# Patient Record
Sex: MALE | Race: WHITE | NOT HISPANIC OR LATINO | Employment: OTHER | URBAN - METROPOLITAN AREA
[De-identification: names, ages, dates, MRNs, and addresses within clinical notes are randomized per-mention and may not be internally consistent; named-entity substitution may affect disease eponyms.]

---

## 2017-03-07 ENCOUNTER — APPOINTMENT (OUTPATIENT)
Dept: CT IMAGING | Facility: HOSPITAL | Age: 62
End: 2017-03-07

## 2017-03-07 ENCOUNTER — APPOINTMENT (OUTPATIENT)
Dept: ULTRASOUND IMAGING | Facility: HOSPITAL | Age: 62
End: 2017-03-07

## 2017-03-07 ENCOUNTER — HOSPITAL ENCOUNTER (INPATIENT)
Facility: HOSPITAL | Age: 62
LOS: 1 days | Discharge: HOME | End: 2017-03-08
Attending: EMERGENCY MEDICINE | Admitting: SURGERY

## 2017-03-07 DIAGNOSIS — R93.5 ABNORMAL CT OF THE ABDOMEN: ICD-10-CM

## 2017-03-07 DIAGNOSIS — K81.0 ACUTE CHOLECYSTITIS: Primary | ICD-10-CM

## 2017-03-07 LAB
ALBUMIN SERPL-MCNC: 4.6 G/DL (ref 3.5–5.2)
ALBUMIN/GLOB SERPL: 1.7 G/DL
ALP SERPL-CCNC: 53 U/L (ref 39–117)
ALT SERPL W P-5'-P-CCNC: 14 U/L (ref 1–41)
ANION GAP SERPL CALCULATED.3IONS-SCNC: 13.5 MMOL/L
AST SERPL-CCNC: 16 U/L (ref 1–40)
BASOPHILS # BLD AUTO: 0.02 10*3/MM3 (ref 0–0.2)
BASOPHILS NFR BLD AUTO: 0.2 % (ref 0–1.5)
BILIRUB SERPL-MCNC: 0.7 MG/DL (ref 0.1–1.2)
BILIRUB UR QL STRIP: NEGATIVE
BUN BLD-MCNC: 13 MG/DL (ref 8–23)
BUN/CREAT SERPL: 12.6 (ref 7–25)
CALCIUM SPEC-SCNC: 9.5 MG/DL (ref 8.6–10.5)
CHLORIDE SERPL-SCNC: 100 MMOL/L (ref 98–107)
CLARITY UR: CLEAR
CO2 SERPL-SCNC: 26.5 MMOL/L (ref 22–29)
COLOR UR: YELLOW
CREAT BLD-MCNC: 1.03 MG/DL (ref 0.76–1.27)
DEPRECATED RDW RBC AUTO: 42.3 FL (ref 37–54)
EOSINOPHIL # BLD AUTO: 0.02 10*3/MM3 (ref 0–0.7)
EOSINOPHIL NFR BLD AUTO: 0.2 % (ref 0.3–6.2)
ERYTHROCYTE [DISTWIDTH] IN BLOOD BY AUTOMATED COUNT: 13.1 % (ref 11.5–14.5)
GFR SERPL CREATININE-BSD FRML MDRD: 73 ML/MIN/1.73
GLOBULIN UR ELPH-MCNC: 2.7 GM/DL
GLUCOSE BLD-MCNC: 98 MG/DL (ref 65–99)
GLUCOSE UR STRIP-MCNC: NEGATIVE MG/DL
HCT VFR BLD AUTO: 42.3 % (ref 40.4–52.2)
HGB BLD-MCNC: 14.5 G/DL (ref 13.7–17.6)
HGB UR QL STRIP.AUTO: NEGATIVE
HOLD SPECIMEN: NORMAL
HOLD SPECIMEN: NORMAL
IMM GRANULOCYTES # BLD: 0.02 10*3/MM3 (ref 0–0.03)
IMM GRANULOCYTES NFR BLD: 0.2 % (ref 0–0.5)
KETONES UR QL STRIP: NEGATIVE
LEUKOCYTE ESTERASE UR QL STRIP.AUTO: NEGATIVE
LIPASE SERPL-CCNC: 23 U/L (ref 13–60)
LYMPHOCYTES # BLD AUTO: 1.25 10*3/MM3 (ref 0.9–4.8)
LYMPHOCYTES NFR BLD AUTO: 10.5 % (ref 19.6–45.3)
MCH RBC QN AUTO: 29.9 PG (ref 27–32.7)
MCHC RBC AUTO-ENTMCNC: 34.3 G/DL (ref 32.6–36.4)
MCV RBC AUTO: 87.2 FL (ref 79.8–96.2)
MONOCYTES # BLD AUTO: 1.46 10*3/MM3 (ref 0.2–1.2)
MONOCYTES NFR BLD AUTO: 12.3 % (ref 5–12)
NEUTROPHILS # BLD AUTO: 9.13 10*3/MM3 (ref 1.9–8.1)
NEUTROPHILS NFR BLD AUTO: 76.6 % (ref 42.7–76)
NITRITE UR QL STRIP: NEGATIVE
PH UR STRIP.AUTO: 6 [PH] (ref 5–8)
PLATELET # BLD AUTO: 139 10*3/MM3 (ref 140–500)
PMV BLD AUTO: 10.3 FL (ref 6–12)
POTASSIUM BLD-SCNC: 4.5 MMOL/L (ref 3.5–5.2)
PROT SERPL-MCNC: 7.3 G/DL (ref 6–8.5)
PROT UR QL STRIP: NEGATIVE
RBC # BLD AUTO: 4.85 10*6/MM3 (ref 4.6–6)
SODIUM BLD-SCNC: 140 MMOL/L (ref 136–145)
SP GR UR STRIP: 1.01 (ref 1–1.03)
TROPONIN T SERPL-MCNC: <0.01 NG/ML (ref 0–0.03)
UROBILINOGEN UR QL STRIP: NORMAL
WBC NRBC COR # BLD: 11.9 10*3/MM3 (ref 4.5–10.7)
WHOLE BLOOD HOLD SPECIMEN: NORMAL
WHOLE BLOOD HOLD SPECIMEN: NORMAL

## 2017-03-07 PROCEDURE — 85025 COMPLETE CBC W/AUTO DIFF WBC: CPT | Performed by: EMERGENCY MEDICINE

## 2017-03-07 PROCEDURE — 84484 ASSAY OF TROPONIN QUANT: CPT | Performed by: EMERGENCY MEDICINE

## 2017-03-07 PROCEDURE — 25010000002 PIPERACILLIN SOD-TAZOBACTAM PER 1 G: Performed by: SURGERY

## 2017-03-07 PROCEDURE — 0FT44ZZ RESECTION OF GALLBLADDER, PERCUTANEOUS ENDOSCOPIC APPROACH: ICD-10-PCS | Performed by: SURGERY

## 2017-03-07 PROCEDURE — 0 IOPAMIDOL 61 % SOLUTION: Performed by: EMERGENCY MEDICINE

## 2017-03-07 PROCEDURE — 83690 ASSAY OF LIPASE: CPT | Performed by: EMERGENCY MEDICINE

## 2017-03-07 PROCEDURE — 80053 COMPREHEN METABOLIC PANEL: CPT | Performed by: EMERGENCY MEDICINE

## 2017-03-07 PROCEDURE — 76705 ECHO EXAM OF ABDOMEN: CPT

## 2017-03-07 PROCEDURE — 99284 EMERGENCY DEPT VISIT MOD MDM: CPT

## 2017-03-07 PROCEDURE — 81003 URINALYSIS AUTO W/O SCOPE: CPT | Performed by: EMERGENCY MEDICINE

## 2017-03-07 PROCEDURE — 74177 CT ABD & PELVIS W/CONTRAST: CPT

## 2017-03-07 RX ORDER — RIZATRIPTAN BENZOATE 10 MG/1
10 TABLET ORAL ONCE AS NEEDED
Status: DISCONTINUED | OUTPATIENT
Start: 2017-03-07 | End: 2017-03-07 | Stop reason: CLARIF

## 2017-03-07 RX ORDER — SODIUM CHLORIDE 0.9 % (FLUSH) 0.9 %
10 SYRINGE (ML) INJECTION AS NEEDED
Status: DISCONTINUED | OUTPATIENT
Start: 2017-03-07 | End: 2017-03-09 | Stop reason: HOSPADM

## 2017-03-07 RX ORDER — ONDANSETRON 4 MG/1
4 TABLET, FILM COATED ORAL EVERY 6 HOURS PRN
Status: DISCONTINUED | OUTPATIENT
Start: 2017-03-07 | End: 2017-03-09 | Stop reason: HOSPADM

## 2017-03-07 RX ORDER — HYDROMORPHONE HYDROCHLORIDE 1 MG/ML
0.5 INJECTION, SOLUTION INTRAMUSCULAR; INTRAVENOUS; SUBCUTANEOUS
Status: DISCONTINUED | OUTPATIENT
Start: 2017-03-07 | End: 2017-03-08

## 2017-03-07 RX ORDER — SUMATRIPTAN 50 MG/1
50 TABLET, FILM COATED ORAL ONCE AS NEEDED
Status: COMPLETED | OUTPATIENT
Start: 2017-03-07 | End: 2017-03-07

## 2017-03-07 RX ORDER — PANTOPRAZOLE SODIUM 40 MG/10ML
40 INJECTION, POWDER, LYOPHILIZED, FOR SOLUTION INTRAVENOUS
Status: DISCONTINUED | OUTPATIENT
Start: 2017-03-08 | End: 2017-03-09 | Stop reason: HOSPADM

## 2017-03-07 RX ORDER — SODIUM CHLORIDE, SODIUM LACTATE, POTASSIUM CHLORIDE, CALCIUM CHLORIDE 600; 310; 30; 20 MG/100ML; MG/100ML; MG/100ML; MG/100ML
150 INJECTION, SOLUTION INTRAVENOUS CONTINUOUS
Status: DISCONTINUED | OUTPATIENT
Start: 2017-03-07 | End: 2017-03-09 | Stop reason: HOSPADM

## 2017-03-07 RX ORDER — ONDANSETRON 2 MG/ML
4 INJECTION INTRAMUSCULAR; INTRAVENOUS EVERY 6 HOURS PRN
Status: DISCONTINUED | OUTPATIENT
Start: 2017-03-07 | End: 2017-03-09 | Stop reason: HOSPADM

## 2017-03-07 RX ORDER — ONDANSETRON 4 MG/1
4 TABLET, ORALLY DISINTEGRATING ORAL EVERY 6 HOURS PRN
Status: DISCONTINUED | OUTPATIENT
Start: 2017-03-07 | End: 2017-03-09 | Stop reason: HOSPADM

## 2017-03-07 RX ORDER — ACETAMINOPHEN 325 MG/1
650 TABLET ORAL EVERY 4 HOURS PRN
Status: DISCONTINUED | OUTPATIENT
Start: 2017-03-07 | End: 2017-03-09 | Stop reason: HOSPADM

## 2017-03-07 RX ORDER — SODIUM CHLORIDE 0.9 % (FLUSH) 0.9 %
1-10 SYRINGE (ML) INJECTION AS NEEDED
Status: DISCONTINUED | OUTPATIENT
Start: 2017-03-07 | End: 2017-03-09 | Stop reason: HOSPADM

## 2017-03-07 RX ORDER — LEVOTHYROXINE SODIUM 0.1 MG/1
100 TABLET ORAL DAILY
Status: DISCONTINUED | OUTPATIENT
Start: 2017-03-07 | End: 2017-03-09 | Stop reason: HOSPADM

## 2017-03-07 RX ORDER — NALOXONE HCL 0.4 MG/ML
0.4 VIAL (ML) INJECTION
Status: DISCONTINUED | OUTPATIENT
Start: 2017-03-07 | End: 2017-03-09 | Stop reason: HOSPADM

## 2017-03-07 RX ORDER — SODIUM CHLORIDE 9 MG/ML
125 INJECTION, SOLUTION INTRAVENOUS CONTINUOUS
Status: DISCONTINUED | OUTPATIENT
Start: 2017-03-07 | End: 2017-03-07

## 2017-03-07 RX ADMIN — SODIUM CHLORIDE 1000 ML: 9 INJECTION, SOLUTION INTRAVENOUS at 10:28

## 2017-03-07 RX ADMIN — SODIUM CHLORIDE, POTASSIUM CHLORIDE, SODIUM LACTATE AND CALCIUM CHLORIDE 150 ML/HR: 600; 310; 30; 20 INJECTION, SOLUTION INTRAVENOUS at 16:00

## 2017-03-07 RX ADMIN — SUMATRIPTAN SUCCINATE 50 MG: 50 TABLET ORAL at 23:28

## 2017-03-07 RX ADMIN — IOPAMIDOL 85 ML: 612 INJECTION, SOLUTION INTRAVENOUS at 10:39

## 2017-03-07 RX ADMIN — SODIUM CHLORIDE 125 ML/HR: 9 INJECTION, SOLUTION INTRAVENOUS at 13:41

## 2017-03-07 RX ADMIN — LEVOTHYROXINE SODIUM 100 MCG: 100 TABLET ORAL at 17:08

## 2017-03-07 RX ADMIN — TAZOBACTAM SODIUM AND PIPERACILLIN SODIUM 4.5 G: 500; 4 INJECTION, SOLUTION INTRAVENOUS at 13:05

## 2017-03-07 RX ADMIN — SODIUM CHLORIDE, POTASSIUM CHLORIDE, SODIUM LACTATE AND CALCIUM CHLORIDE 150 ML/HR: 600; 310; 30; 20 INJECTION, SOLUTION INTRAVENOUS at 23:29

## 2017-03-07 NOTE — PLAN OF CARE
Problem: Patient Care Overview (Adult)  Goal: Plan of Care Review  Outcome: Ongoing (interventions implemented as appropriate)    03/07/17 2065   Coping/Psychosocial Response Interventions   Plan Of Care Reviewed With patient;spouse;daughter   Outcome Evaluation   Outcome Summary/Follow up Plan pt admitted to unit, has not requested pain medicine, given iv fluids, scds applied, vitals stable. tolertating clear liquid diet.       Goal: Adult Individualization and Mutuality  Outcome: Ongoing (interventions implemented as appropriate)  Goal: Discharge Needs Assessment  Outcome: Ongoing (interventions implemented as appropriate)    Problem: Pain, Acute (Adult)  Goal: Identify Related Risk Factors and Signs and Symptoms  Outcome: Ongoing (interventions implemented as appropriate)  Goal: Acceptable Pain Control/Comfort Level  Outcome: Ongoing (interventions implemented as appropriate)    Problem: Cholecystitis/Cholecystectomy (Adult)  Goal: Signs and Symptoms of Listed Potential Problems Will be Absent or Manageable (Cholecystitis/Cholecystectomy)  Outcome: Ongoing (interventions implemented as appropriate)

## 2017-03-07 NOTE — ED PROVIDER NOTES
" EMERGENCY DEPARTMENT ENCOUNTER    CHIEF COMPLAINT  Chief Complaint: abd pain  History given by: patient, spouse  History limited by: nothing  Room Number: 651/1  PMD: Reji Moyer MD      HPI:  Pt is a 61 y.o. male who presents complaining of intermittent worsening periumbilical \"aching\" abd pain onset 3 weeks ago, with pain becoming severe 1 day ago. Pt also c/o nausea, low-grade fever (99.2), vomiting x1 but denies SOA, chest pain, diarrhea. Pt has no hx of abd surgery. Pt is a smoker but does not drink. He takes Metamucil daily and states the pain worsened yesterday after drinking his Metamucil. He declines any pain medication at this time.    Duration:  3 weeks  Onset: gradual  Timing: intermittent  Location: periumbilical  Radiation: mid-chest and back yesterday  Quality: aching  Intensity/Severity: severe  Progression: worsening  Associated Symptoms: nausea  Aggravating Factors: eating  Alleviating Factors: not eating  Previous Episodes: none  Treatment before arrival: none    PAST MEDICAL HISTORY  Active Ambulatory Problems     Diagnosis Date Noted   • No Active Ambulatory Problems     Resolved Ambulatory Problems     Diagnosis Date Noted   • No Resolved Ambulatory Problems     Past Medical History   Diagnosis Date   • Disease of thyroid gland    • GERD (gastroesophageal reflux disease)    • Hyperlipidemia    • Migraine        PAST SURGICAL HISTORY  Past Surgical History   Procedure Laterality Date   • Hemorrhoidectomy         FAMILY HISTORY  Family History   Problem Relation Age of Onset   • Heart disease Mother    • Heart disease Father    • Stroke Father        SOCIAL HISTORY  Social History     Social History   • Marital status:      Spouse name: N/A   • Number of children: N/A   • Years of education: N/A     Occupational History   • Not on file.     Social History Main Topics   • Smoking status: Current Every Day Smoker     Packs/day: 1.00   • Smokeless tobacco: Not on file   • Alcohol use " No   • Drug use: No   • Sexual activity: Not on file     Other Topics Concern   • Not on file     Social History Narrative       ALLERGIES  Review of patient's allergies indicates no known allergies.    REVIEW OF SYSTEMS  Review of Systems   Constitutional: Positive for fever (99.2). Negative for chills.   HENT: Negative for congestion and sore throat.    Eyes: Negative.    Respiratory: Negative for cough and shortness of breath.    Cardiovascular: Negative for chest pain and leg swelling.   Gastrointestinal: Positive for abdominal pain (periumbilical), nausea and vomiting (x1). Negative for diarrhea.   Genitourinary: Negative for difficulty urinating and dysuria.   Musculoskeletal: Negative for back pain and neck pain.   Skin: Negative for rash and wound.   Allergic/Immunologic: Negative.    Neurological: Negative for dizziness, weakness, numbness and headaches.   Psychiatric/Behavioral: Negative.    All other systems reviewed and are negative.      PHYSICAL EXAM  ED Triage Vitals   Temp Heart Rate Resp BP SpO2   03/07/17 0931 03/07/17 0931 03/07/17 0931 03/07/17 0954 03/07/17 0931   98.2 °F (36.8 °C) 105 18 144/79 97 %      Temp src Heart Rate Source Patient Position BP Location FiO2 (%)   03/07/17 0931 03/07/17 0931 03/07/17 0954 03/07/17 0954 --   Tympanic Monitor Sitting Right arm        Physical Exam   Constitutional: He is oriented to person, place, and time and well-developed, well-nourished, and in no distress.   HENT:   Head: Normocephalic and atraumatic.   Eyes: EOM are normal. Pupils are equal, round, and reactive to light.   Neck: Normal range of motion. Neck supple.   Cardiovascular: Normal rate, regular rhythm and normal heart sounds.    Pulmonary/Chest: Effort normal and breath sounds normal. No respiratory distress.   Abdominal: Soft. There is tenderness (RUQ and R mid). There is no rebound and no guarding.   Musculoskeletal: Normal range of motion. He exhibits no edema.   Neurological: He is alert  and oriented to person, place, and time. He has normal sensation and normal strength.   Skin: Skin is warm and dry.   Psychiatric: Mood and affect normal.   Nursing note and vitals reviewed.      LAB RESULTS  Lab Results (last 24 hours)     Procedure Component Value Units Date/Time    Urinalysis With / Culture If Indicated [[id number]]  (Normal) Collected:  03/07/17 0955    Specimen:  Urine from Urine, Clean Catch Updated:  03/07/17 1035     Color, UA Yellow      Appearance, UA Clear      pH, UA 6.0      Specific Gravity, UA 1.010      Glucose, UA Negative      Ketones, UA Negative      Bilirubin, UA Negative      Blood, UA Negative      Protein, UA Negative      Leuk Esterase, UA Negative      Nitrite, UA Negative      Urobilinogen, UA 0.2 E.U./dL     Narrative:       Urine microscopic not indicated.    CBC & Differential [[id number]] Collected:  03/07/17 0958    Specimen:  Blood Updated:  03/07/17 1008    Narrative:       The following orders were created for panel order CBC & Differential.  Procedure                               Abnormality         Status                     ---------                               -----------         ------                     CBC Auto Differential[[id number]]         Abnormal            Final result                 Please view results for these tests on the individual orders.    Comprehensive Metabolic Panel [[id number]] Collected:  03/07/17 0958    Specimen:  Blood Updated:  03/07/17 1030     Glucose 98 mg/dL      BUN 13 mg/dL      Creatinine 1.03 mg/dL      Sodium 140 mmol/L      Potassium 4.5 mmol/L      Chloride 100 mmol/L      CO2 26.5 mmol/L      Calcium 9.5 mg/dL      Total Protein 7.3 g/dL      Albumin 4.60 g/dL      ALT (SGPT) 14 U/L      AST (SGOT) 16 U/L      Alkaline Phosphatase 53 U/L      Total Bilirubin 0.7 mg/dL      eGFR Non African Amer 73 mL/min/1.73      Globulin 2.7 gm/dL      A/G Ratio 1.7 g/dL      BUN/Creatinine Ratio 12.6      Anion Gap 13.5 mmol/L     Lipase  [[id number]]  (Normal) Collected:  03/07/17 0958    Specimen:  Blood Updated:  03/07/17 1030     Lipase 23 U/L     CBC Auto Differential [[id number]]  (Abnormal) Collected:  03/07/17 0958    Specimen:  Blood Updated:  03/07/17 1008     WBC 11.90 (H) 10*3/mm3      RBC 4.85 10*6/mm3      Hemoglobin 14.5 g/dL      Hematocrit 42.3 %      MCV 87.2 fL      MCH 29.9 pg      MCHC 34.3 g/dL      RDW 13.1 %      RDW-SD 42.3 fl      MPV 10.3 fL      Platelets 139 (L) 10*3/mm3      Neutrophil % 76.6 (H) %      Lymphocyte % 10.5 (L) %      Monocyte % 12.3 (H) %      Eosinophil % 0.2 (L) %      Basophil % 0.2 %      Immature Grans % 0.2 %      Neutrophils, Absolute 9.13 (H) 10*3/mm3      Lymphocytes, Absolute 1.25 10*3/mm3      Monocytes, Absolute 1.46 (H) 10*3/mm3      Eosinophils, Absolute 0.02 10*3/mm3      Basophils, Absolute 0.02 10*3/mm3      Immature Grans, Absolute 0.02 10*3/mm3     Troponin [[id number]]  (Normal) Collected:  03/07/17 0958    Specimen:  Blood Updated:  03/07/17 1049     Troponin T <0.010 ng/mL     Narrative:       Troponin T Reference Ranges:  Less than 0.03 ng/mL:    Negative for AMI  0.03 to 0.09 ng/mL:      Indeterminant for AMI  Greater than 0.09 ng/mL: Positive for AMI        I ordered the above labs and reviewed the results    RADIOLOGY  US Gallbladder   Final Result      CT Abdomen Pelvis With Contrast   Final Result      NM HIDA Scan With Pharmacological Intervention    (Results Pending)     I ordered the above noted radiological studies. Interpreted by radiologist. Discussed with radiologist (Dr. Delgado). Reviewed by me in PACS.       PROCEDURES  Procedures      PROGRESS AND CONSULTS  ED Course     1022  D/w pt and family plan to give IVF and order CT abd/pel for further evaluation. Pt and family understand and agree with the plan. All questions answered.  Ordered CT abd/pel to r/o cholecystitis. Ordered troponin to r/o MI.    1125  Rechecked pt who is resting comfortably. On re-exam pain is mostly in  RUQ, somewhat in mid R abd, suspect gall bladder as source. D/w pt and family CT abd/pel that shows fluid around gallbladder but nothing acute, and abnormal appendix and plan to order US gallbladder, consult surgeon, and admit pt for further evaluation.    1141  Ordered US gallbladder for further evaluation. Placed call to surgery for consult.    1145  Call returned from assistant for Dr. Walker, surgery, who will admit pt.    1201  Call returned from Dr. Walker to discuss pt.    DR Swann saw patient in ED    MEDICAL DECISION MAKING  Results were reviewed/discussed with the patient and they were also made aware of online access. Pt also made aware that some labs, such as cultures, will not be resulted during ER visit and follow up with PMD is necessary.     MDM  Number of Diagnoses or Management Options     Amount and/or Complexity of Data Reviewed  Clinical lab tests: ordered and reviewed (Lipase=23)  Tests in the radiology section of CPT®: ordered and reviewed (CT abd/pel shows pericholecystic fluid and appendix wall irregularities.)  Decide to obtain previous medical records or to obtain history from someone other than the patient: yes  Review and summarize past medical records: yes (Pt was seen this morning at [facility] and sent to the ED. No tests were done at [facility].)  Discuss the patient with other providers: yes (Dr. Delgado, radiology)  Independent visualization of images, tracings, or specimens: yes    Patient Progress  Patient progress: stable         DIAGNOSIS  Final diagnoses:   Acute cholecystitis   Abnormal CT of the appendix       DISPOSITION  ADMISSION    Discussed treatment plan and reason for admission with pt/family and admitting physician.  Pt/family voiced understanding of the plan for admission for further testing/treatment as needed.     Latest Documented Vital Signs:  As of 5:31 PM  BP- 123/69 HR- 76 Temp- 98.5 °F (36.9 °C) (Oral) O2 sat- 96%    --  Documentation assistance provided by junito Albright  Keiry for Dr. Retana.  Information recorded by the scribe was done at my direction and has been verified and validated by me.     Jose Ramon Ricci  03/07/17 120       Pravin Retana MD  03/07/17 6744

## 2017-03-07 NOTE — H&P
"March 7, 2017    Manuelito De Santiago  [id number]      GENERAL SURGERY HISTORY AND PHYSICAL    ADMITTING PHYSICIAN:  Anmol Walker M.D.    PRIMARY PHYSICIAN:   Reji Moyer MD.    DIAGNOSIS:  Right upper quadrant pain.    HISTORY:  Manuelito De Santiago is a 61 y.o. male who is admitted to the hospital with right upper quadrant pain and suspicious for acute cholecystitis.  He has had 3-4 months of intermittent colicky worsening right upper quadrant pain aggravated by eating.  He has been \"dealing with it\" and really hasn't changed his diet very much.  The last week or so the attacks became more severe and lasted in duration for much longer than he was use to.  His family finally convinced him to come to the ER after the one starting yesterday afternoon pre-much carried through the night.  He was seen and evaluated in the ER and found to have right upper quadrant pain but his workup thus far has been fairly negative other than a little fluid around the gallbladder seen on several studies.  Surgery was asked to admit for further evaluation.  The patient has a known history of reflux and this is different.  He denies any blood from above or below and he denies any urinary or lower intestinal complaints.  He's had no chest pain, shortness of breath, fever, chills or other constitutional complaints.  He has thus far refused narcotics secondary to \"the way it makes him feel.\"      Past Medical History   Diagnosis Date   • Disease of thyroid gland    • GERD (gastroesophageal reflux disease)    • Hyperlipidemia    • Migraine      Past Surgical History   Procedure Laterality Date   • Hemorrhoidectomy       Family History   Problem Relation Age of Onset   • Heart disease Mother    • Heart disease Father    • Stroke Father      Social History   Substance Use Topics   • Smoking status: Current Every Day Smoker     Packs/day: 1.00   • Smokeless tobacco: None   • Alcohol use No       Review of Systems  On questioning, the patient " "denies any weight loss, fatigue or headache, no visual changes or hearing complaints, no new cardiovascular or pulmonary complaints, no  complaints, no musculoskeletal or neurologic complaints, no skin, bleeding, psychiatric or endocrine complaints.    Vitals:    03/07/17 1304 03/07/17 1305 03/07/17 1315 03/07/17 1316   BP: 115/62 113/70     BP Location:       Patient Position:       Pulse: 84 84 84    Resp:       Temp:    99.3 °F (37.4 °C)   TempSrc:    Tympanic   SpO2: 96% 95% 96%    Weight:       Height:           Physical Exam  Visit Vitals   • /70   • Pulse 84   • Temp 99.3 °F (37.4 °C) (Tympanic)   • Resp 18   • Ht 70\" (177.8 cm)   • Wt 165 lb (74.8 kg)   • SpO2 96%   • BMI 23.68 kg/m2       On exam, patient is alert oriented and appropriate and is in no acute distress.  HEENT:   Head is normocephalic, both external ears are normal and sclerae are nonicteric.  Neck:  Supple without lymphadenopathy.  Heart:  Regular without obvious murmur.  Chest:  Good respiratory effort bilaterally.  Abdomen:  Soft, nondistended, tenderness moderate, with voluntary guarding (Macdonald's sign), without rebound - RUQ, RLQ negative, no masses palpated.  Rectal:  Deferred.  Extremities:  Without edema.  Skin:  Negative.    Diagnostic Imaging: CT scan images and results reviewed.  US Gallbladder         CT Abdomen Pelvis With Contrast   Final Result          Labs:   Lab Results   Component Value Date    WBC 11.90 (H) 03/07/2017    HGB 14.5 03/07/2017    HCT 42.3 03/07/2017     (L) 03/07/2017     Lab Results   Component Value Date     03/07/2017    K 4.5 03/07/2017     03/07/2017    CO2 26.5 03/07/2017    BUN 13 03/07/2017    CREATININE 1.03 03/07/2017    GLUCOSE 98 03/07/2017     Lab Results   Component Value Date    AST 16 03/07/2017    ALT 14 03/07/2017    ALKPHOS 53 03/07/2017       ASSESSMENT/PLAN:  Patient is a 61 y.o. male who is admitted to the hospital with right upper quadrant pain, a Macdonald sign " on exam and fairly classic biliary colic type complaints all seemingly consistent with cholecystitis including his mild leukocytosis.  However, thus far, his x-ray tests have not confirmed said diagnosis.  Therefore, he will be admitted, supported and treated conservatively and a HIDA scan will be ordered for the morning.  If the HIDA scan is abnormal, we will proceed with a laparoscopic cholecystectomy.  If it is not abnormal, an upper endoscopy will be considered as the next phase of his evaluation.  The plan was discussed at length and gone over with him and his family at the bedside and all agreement.    Anmol Walker M.D.

## 2017-03-08 ENCOUNTER — ANESTHESIA EVENT (OUTPATIENT)
Dept: PERIOP | Facility: HOSPITAL | Age: 62
End: 2017-03-08

## 2017-03-08 ENCOUNTER — APPOINTMENT (OUTPATIENT)
Dept: NUCLEAR MEDICINE | Facility: HOSPITAL | Age: 62
End: 2017-03-08

## 2017-03-08 ENCOUNTER — ANESTHESIA (OUTPATIENT)
Dept: PERIOP | Facility: HOSPITAL | Age: 62
End: 2017-03-08

## 2017-03-08 VITALS
RESPIRATION RATE: 16 BRPM | WEIGHT: 166 LBS | HEIGHT: 70 IN | DIASTOLIC BLOOD PRESSURE: 67 MMHG | HEART RATE: 69 BPM | BODY MASS INDEX: 23.77 KG/M2 | SYSTOLIC BLOOD PRESSURE: 124 MMHG | TEMPERATURE: 97.4 F | OXYGEN SATURATION: 95 %

## 2017-03-08 PROBLEM — K81.0 ACUTE CHOLECYSTITIS: Status: ACTIVE | Noted: 2017-03-08

## 2017-03-08 PROBLEM — K81.0 ACUTE CHOLECYSTITIS: Status: RESOLVED | Noted: 2017-03-07 | Resolved: 2017-03-08

## 2017-03-08 PROCEDURE — 25010000002 KETOROLAC TROMETHAMINE PER 15 MG: Performed by: NURSE ANESTHETIST, CERTIFIED REGISTERED

## 2017-03-08 PROCEDURE — 25010000002 FENTANYL CITRATE (PF) 100 MCG/2ML SOLUTION: Performed by: NURSE ANESTHETIST, CERTIFIED REGISTERED

## 2017-03-08 PROCEDURE — 88304 TISSUE EXAM BY PATHOLOGIST: CPT | Performed by: SURGERY

## 2017-03-08 PROCEDURE — 78227 HEPATOBIL SYST IMAGE W/DRUG: CPT

## 2017-03-08 PROCEDURE — G0378 HOSPITAL OBSERVATION PER HR: HCPCS

## 2017-03-08 PROCEDURE — 25010000002 ONDANSETRON PER 1 MG: Performed by: NURSE ANESTHETIST, CERTIFIED REGISTERED

## 2017-03-08 PROCEDURE — 25010000002 LEVOFLOXACIN PER 250 MG: Performed by: SURGERY

## 2017-03-08 PROCEDURE — 25010000002 NEOSTIGMINE 10 MG/10ML SOLUTION: Performed by: NURSE ANESTHETIST, CERTIFIED REGISTERED

## 2017-03-08 PROCEDURE — 25010000002 PROPOFOL 10 MG/ML EMULSION: Performed by: NURSE ANESTHETIST, CERTIFIED REGISTERED

## 2017-03-08 PROCEDURE — 25010000002 DEXAMETHASONE PER 1 MG: Performed by: NURSE ANESTHETIST, CERTIFIED REGISTERED

## 2017-03-08 PROCEDURE — 0 TECHNETIUM TC 99M MEBROFENIN KIT: Performed by: SURGERY

## 2017-03-08 PROCEDURE — 25010000002 MIDAZOLAM PER 1 MG: Performed by: ANESTHESIOLOGY

## 2017-03-08 PROCEDURE — A9537 TC99M MEBROFENIN: HCPCS | Performed by: SURGERY

## 2017-03-08 PROCEDURE — 25010000002 SINCALIDE PER 5 MCG: Performed by: SURGERY

## 2017-03-08 RX ORDER — KETOROLAC TROMETHAMINE 30 MG/ML
INJECTION, SOLUTION INTRAMUSCULAR; INTRAVENOUS AS NEEDED
Status: DISCONTINUED | OUTPATIENT
Start: 2017-03-08 | End: 2017-03-08 | Stop reason: SURG

## 2017-03-08 RX ORDER — GLYCOPYRROLATE 0.2 MG/ML
INJECTION INTRAMUSCULAR; INTRAVENOUS AS NEEDED
Status: DISCONTINUED | OUTPATIENT
Start: 2017-03-08 | End: 2017-03-08 | Stop reason: SURG

## 2017-03-08 RX ORDER — NEOSTIGMINE METHYLSULFATE 1 MG/ML
INJECTION, SOLUTION INTRAVENOUS AS NEEDED
Status: DISCONTINUED | OUTPATIENT
Start: 2017-03-08 | End: 2017-03-08 | Stop reason: SURG

## 2017-03-08 RX ORDER — HYDROMORPHONE HYDROCHLORIDE 1 MG/ML
0.5 INJECTION, SOLUTION INTRAMUSCULAR; INTRAVENOUS; SUBCUTANEOUS
Status: DISCONTINUED | OUTPATIENT
Start: 2017-03-08 | End: 2017-03-09 | Stop reason: HOSPADM

## 2017-03-08 RX ORDER — MIDAZOLAM HYDROCHLORIDE 1 MG/ML
1 INJECTION INTRAMUSCULAR; INTRAVENOUS
Status: DISCONTINUED | OUTPATIENT
Start: 2017-03-08 | End: 2017-03-09 | Stop reason: HOSPADM

## 2017-03-08 RX ORDER — METOCLOPRAMIDE HYDROCHLORIDE 5 MG/ML
10 INJECTION INTRAMUSCULAR; INTRAVENOUS ONCE
Status: DISCONTINUED | OUTPATIENT
Start: 2017-03-08 | End: 2017-03-09 | Stop reason: HOSPADM

## 2017-03-08 RX ORDER — ROCURONIUM BROMIDE 10 MG/ML
INJECTION, SOLUTION INTRAVENOUS AS NEEDED
Status: DISCONTINUED | OUTPATIENT
Start: 2017-03-08 | End: 2017-03-08 | Stop reason: SURG

## 2017-03-08 RX ORDER — METOCLOPRAMIDE HYDROCHLORIDE 5 MG/ML
INJECTION INTRAMUSCULAR; INTRAVENOUS
Status: DISPENSED
Start: 2017-03-08 | End: 2017-03-09

## 2017-03-08 RX ORDER — FENTANYL CITRATE 50 UG/ML
INJECTION, SOLUTION INTRAMUSCULAR; INTRAVENOUS AS NEEDED
Status: DISCONTINUED | OUTPATIENT
Start: 2017-03-08 | End: 2017-03-08 | Stop reason: SURG

## 2017-03-08 RX ORDER — FAMOTIDINE 10 MG/ML
20 INJECTION, SOLUTION INTRAVENOUS ONCE
Status: COMPLETED | OUTPATIENT
Start: 2017-03-08 | End: 2017-03-08

## 2017-03-08 RX ORDER — ONDANSETRON 2 MG/ML
INJECTION INTRAMUSCULAR; INTRAVENOUS AS NEEDED
Status: DISCONTINUED | OUTPATIENT
Start: 2017-03-08 | End: 2017-03-08 | Stop reason: SURG

## 2017-03-08 RX ORDER — MIDAZOLAM HYDROCHLORIDE 1 MG/ML
2 INJECTION INTRAMUSCULAR; INTRAVENOUS
Status: DISCONTINUED | OUTPATIENT
Start: 2017-03-08 | End: 2017-03-09 | Stop reason: HOSPADM

## 2017-03-08 RX ORDER — HYDROCODONE BITARTRATE AND ACETAMINOPHEN 7.5; 325 MG/1; MG/1
1 TABLET ORAL ONCE AS NEEDED
Status: DISCONTINUED | OUTPATIENT
Start: 2017-03-08 | End: 2017-03-09 | Stop reason: HOSPADM

## 2017-03-08 RX ORDER — PROMETHAZINE HYDROCHLORIDE 25 MG/1
12.5 TABLET ORAL ONCE AS NEEDED
Status: DISCONTINUED | OUTPATIENT
Start: 2017-03-08 | End: 2017-03-09 | Stop reason: HOSPADM

## 2017-03-08 RX ORDER — SODIUM CHLORIDE, SODIUM LACTATE, POTASSIUM CHLORIDE, CALCIUM CHLORIDE 600; 310; 30; 20 MG/100ML; MG/100ML; MG/100ML; MG/100ML
9 INJECTION, SOLUTION INTRAVENOUS CONTINUOUS
Status: DISCONTINUED | OUTPATIENT
Start: 2017-03-08 | End: 2017-03-09 | Stop reason: HOSPADM

## 2017-03-08 RX ORDER — KIT FOR THE PREPARATION OF TECHNETIUM TC 99M MEBROFENIN 45 MG/10ML
1 INJECTION, POWDER, LYOPHILIZED, FOR SOLUTION INTRAVENOUS
Status: COMPLETED | OUTPATIENT
Start: 2017-03-08 | End: 2017-03-08

## 2017-03-08 RX ORDER — SODIUM CHLORIDE 9 MG/ML
INJECTION, SOLUTION INTRAVENOUS AS NEEDED
Status: DISCONTINUED | OUTPATIENT
Start: 2017-03-08 | End: 2017-03-08 | Stop reason: HOSPADM

## 2017-03-08 RX ORDER — FENTANYL CITRATE 50 UG/ML
50 INJECTION, SOLUTION INTRAMUSCULAR; INTRAVENOUS
Status: DISCONTINUED | OUTPATIENT
Start: 2017-03-08 | End: 2017-03-09 | Stop reason: HOSPADM

## 2017-03-08 RX ORDER — DIPHENHYDRAMINE HYDROCHLORIDE 50 MG/ML
12.5 INJECTION INTRAMUSCULAR; INTRAVENOUS
Status: DISCONTINUED | OUTPATIENT
Start: 2017-03-08 | End: 2017-03-09 | Stop reason: HOSPADM

## 2017-03-08 RX ORDER — BUPIVACAINE HYDROCHLORIDE AND EPINEPHRINE 2.5; 5 MG/ML; UG/ML
INJECTION, SOLUTION INFILTRATION; PERINEURAL AS NEEDED
Status: DISCONTINUED | OUTPATIENT
Start: 2017-03-08 | End: 2017-03-08 | Stop reason: HOSPADM

## 2017-03-08 RX ORDER — OXYCODONE HYDROCHLORIDE AND ACETAMINOPHEN 5; 325 MG/1; MG/1
2 TABLET ORAL ONCE AS NEEDED
Status: DISCONTINUED | OUTPATIENT
Start: 2017-03-08 | End: 2017-03-09 | Stop reason: HOSPADM

## 2017-03-08 RX ORDER — ONDANSETRON 2 MG/ML
4 INJECTION INTRAMUSCULAR; INTRAVENOUS ONCE AS NEEDED
Status: DISCONTINUED | OUTPATIENT
Start: 2017-03-08 | End: 2017-03-09 | Stop reason: HOSPADM

## 2017-03-08 RX ORDER — PROMETHAZINE HYDROCHLORIDE 25 MG/ML
12.5 INJECTION, SOLUTION INTRAMUSCULAR; INTRAVENOUS ONCE AS NEEDED
Status: DISCONTINUED | OUTPATIENT
Start: 2017-03-08 | End: 2017-03-09 | Stop reason: HOSPADM

## 2017-03-08 RX ORDER — SODIUM CHLORIDE 0.9 % (FLUSH) 0.9 %
1-10 SYRINGE (ML) INJECTION AS NEEDED
Status: DISCONTINUED | OUTPATIENT
Start: 2017-03-08 | End: 2017-03-09 | Stop reason: HOSPADM

## 2017-03-08 RX ORDER — DEXAMETHASONE SODIUM PHOSPHATE 10 MG/ML
INJECTION INTRAMUSCULAR; INTRAVENOUS AS NEEDED
Status: DISCONTINUED | OUTPATIENT
Start: 2017-03-08 | End: 2017-03-08 | Stop reason: SURG

## 2017-03-08 RX ORDER — OXYCODONE HYDROCHLORIDE AND ACETAMINOPHEN 5; 325 MG/1; MG/1
2 TABLET ORAL EVERY 4 HOURS PRN
Qty: 30 TABLET | Refills: 0 | Status: SHIPPED | OUTPATIENT
Start: 2017-03-08 | End: 2020-05-15

## 2017-03-08 RX ORDER — LEVOFLOXACIN 5 MG/ML
500 INJECTION, SOLUTION INTRAVENOUS EVERY 24 HOURS
Status: DISCONTINUED | OUTPATIENT
Start: 2017-03-08 | End: 2017-03-09 | Stop reason: HOSPADM

## 2017-03-08 RX ORDER — LIDOCAINE HYDROCHLORIDE 20 MG/ML
INJECTION, SOLUTION INFILTRATION; PERINEURAL AS NEEDED
Status: DISCONTINUED | OUTPATIENT
Start: 2017-03-08 | End: 2017-03-08 | Stop reason: SURG

## 2017-03-08 RX ORDER — PROPOFOL 10 MG/ML
VIAL (ML) INTRAVENOUS AS NEEDED
Status: DISCONTINUED | OUTPATIENT
Start: 2017-03-08 | End: 2017-03-08 | Stop reason: SURG

## 2017-03-08 RX ORDER — PROMETHAZINE HYDROCHLORIDE 25 MG/1
25 SUPPOSITORY RECTAL ONCE AS NEEDED
Status: DISCONTINUED | OUTPATIENT
Start: 2017-03-08 | End: 2017-03-09 | Stop reason: HOSPADM

## 2017-03-08 RX ORDER — OXYCODONE AND ACETAMINOPHEN 7.5; 325 MG/1; MG/1
1 TABLET ORAL ONCE AS NEEDED
Status: DISCONTINUED | OUTPATIENT
Start: 2017-03-08 | End: 2017-03-09 | Stop reason: HOSPADM

## 2017-03-08 RX ORDER — LABETALOL HYDROCHLORIDE 5 MG/ML
5 INJECTION, SOLUTION INTRAVENOUS
Status: DISCONTINUED | OUTPATIENT
Start: 2017-03-08 | End: 2017-03-09 | Stop reason: HOSPADM

## 2017-03-08 RX ORDER — PROMETHAZINE HYDROCHLORIDE 25 MG/1
25 TABLET ORAL ONCE AS NEEDED
Status: DISCONTINUED | OUTPATIENT
Start: 2017-03-08 | End: 2017-03-09 | Stop reason: HOSPADM

## 2017-03-08 RX ORDER — FLUMAZENIL 0.1 MG/ML
0.2 INJECTION INTRAVENOUS AS NEEDED
Status: DISCONTINUED | OUTPATIENT
Start: 2017-03-08 | End: 2017-03-09 | Stop reason: HOSPADM

## 2017-03-08 RX ORDER — MAGNESIUM HYDROXIDE 1200 MG/15ML
LIQUID ORAL AS NEEDED
Status: DISCONTINUED | OUTPATIENT
Start: 2017-03-08 | End: 2017-03-08 | Stop reason: HOSPADM

## 2017-03-08 RX ORDER — HYDRALAZINE HYDROCHLORIDE 20 MG/ML
5 INJECTION INTRAMUSCULAR; INTRAVENOUS
Status: DISCONTINUED | OUTPATIENT
Start: 2017-03-08 | End: 2017-03-09 | Stop reason: HOSPADM

## 2017-03-08 RX ADMIN — DEXAMETHASONE SODIUM PHOSPHATE 6 MG: 10 INJECTION INTRAMUSCULAR; INTRAVENOUS at 11:36

## 2017-03-08 RX ADMIN — LEVOFLOXACIN 500 MG: 5 INJECTION, SOLUTION INTRAVENOUS at 11:07

## 2017-03-08 RX ADMIN — GLYCOPYRROLATE 0.6 MG: 0.2 INJECTION INTRAMUSCULAR; INTRAVENOUS at 11:51

## 2017-03-08 RX ADMIN — KETOROLAC TROMETHAMINE 30 MG: 30 INJECTION, SOLUTION INTRAMUSCULAR; INTRAVENOUS at 11:43

## 2017-03-08 RX ADMIN — SODIUM CHLORIDE, POTASSIUM CHLORIDE, SODIUM LACTATE AND CALCIUM CHLORIDE 9 ML/HR: 600; 310; 30; 20 INJECTION, SOLUTION INTRAVENOUS at 10:30

## 2017-03-08 RX ADMIN — OXYCODONE HYDROCHLORIDE AND ACETAMINOPHEN 2 TABLET: 5; 325 TABLET ORAL at 13:14

## 2017-03-08 RX ADMIN — MEBROFENIN 1 DOSE: 45 INJECTION, POWDER, LYOPHILIZED, FOR SOLUTION INTRAVENOUS at 06:32

## 2017-03-08 RX ADMIN — PROPOFOL 200 MG: 10 INJECTION, EMULSION INTRAVENOUS at 11:24

## 2017-03-08 RX ADMIN — FENTANYL CITRATE 50 MCG: 50 INJECTION INTRAMUSCULAR; INTRAVENOUS at 11:41

## 2017-03-08 RX ADMIN — FAMOTIDINE 20 MG: 10 INJECTION, SOLUTION INTRAVENOUS at 11:03

## 2017-03-08 RX ADMIN — SODIUM CHLORIDE, POTASSIUM CHLORIDE, SODIUM LACTATE AND CALCIUM CHLORIDE 150 ML/HR: 600; 310; 30; 20 INJECTION, SOLUTION INTRAVENOUS at 06:06

## 2017-03-08 RX ADMIN — ROCURONIUM BROMIDE 25 MG: 10 INJECTION INTRAVENOUS at 11:24

## 2017-03-08 RX ADMIN — LIDOCAINE HYDROCHLORIDE 60 MG: 20 INJECTION, SOLUTION INFILTRATION; PERINEURAL at 11:24

## 2017-03-08 RX ADMIN — NEOSTIGMINE METHYLSULFATE 4 MG: 1 INJECTION INTRAVENOUS at 11:51

## 2017-03-08 RX ADMIN — FENTANYL CITRATE 50 MCG: 50 INJECTION INTRAMUSCULAR; INTRAVENOUS at 12:07

## 2017-03-08 RX ADMIN — MIDAZOLAM 2 MG: 1 INJECTION INTRAMUSCULAR; INTRAVENOUS at 11:03

## 2017-03-08 RX ADMIN — FENTANYL CITRATE 50 MCG: 50 INJECTION INTRAMUSCULAR; INTRAVENOUS at 11:23

## 2017-03-08 RX ADMIN — ONDANSETRON 4 MG: 2 INJECTION INTRAMUSCULAR; INTRAVENOUS at 11:43

## 2017-03-08 NOTE — ANESTHESIA PROCEDURE NOTES
Airway  Urgency: elective    Date/Time: 3/8/2017 11:27 AM  Airway not difficult    General Information and Staff    Patient location during procedure: OR  Anesthesiologist: CHATO LEIVA  CRNA: DINESH ESPINAL    Indications and Patient Condition  Indications for airway management: airway protection    Preoxygenated: yes  Mask difficulty assessment: 1 - vent by mask    Final Airway Details  Final airway type: endotracheal airway      Successful airway: ETT  Cuffed: yes   Successful intubation technique: direct laryngoscopy  Endotracheal tube insertion site: oral  Blade: Nick  Blade size: #4  ETT size: 7.5 mm  Cormack-Lehane Classification: grade I - full view of glottis  Placement verified by: chest auscultation and capnometry   Measured from: lips  ETT to lips (cm): 22  Number of attempts at approach: 1    Additional Comments  Pre 02 , sivi,, easy bvm, dlx1, dvvc, intubation x 1 attempt, appears atraumatic, teeth unchanged

## 2017-03-08 NOTE — PLAN OF CARE
Problem: Patient Care Overview (Adult)  Goal: Discharge Needs Assessment  Outcome: Outcome(s) achieved Date Met:  03/08/17 03/08/17 2461   Discharge Needs Assessment   Concerns To Be Addressed no discharge needs identified   Equipment Needed After Discharge none   Discharge Disposition home or self-care   Self-Care   Equipment Currently Used at Home none   Current Health   Anticipated Changes Related to Illness none   Living Environment   Transportation Available car;family or friend will provide         Problem: Pain, Acute (Adult)  Goal: Identify Related Risk Factors and Signs and Symptoms  Outcome: Outcome(s) achieved Date Met:  03/08/17

## 2017-03-08 NOTE — PLAN OF CARE
Problem: Cholecystitis/Cholecystectomy (Adult)  Goal: Signs and Symptoms of Listed Potential Problems Will be Absent or Manageable (Cholecystitis/Cholecystectomy)  Outcome: Outcome(s) achieved Date Met:  03/08/17 03/08/17 1351   Cholecystitis/Cholecystectomy   Problems Assessed (Cholecystitis/Cholecystectomy) all   Problems Present (Cholecystitis/Cholecystectomy) none

## 2017-03-08 NOTE — OP NOTE
March 8, 2017    Manuelito De Santiago  [id number]    PROCEDURE:  Laparoscopic cholecystectomy.    PREOPERATIVE DIAGNOSIS:  acute cholecystitis.    POSTOPERATIVE DIAGNOSIS:  same.     SURGEON:  Anmol Walker M.D.    ASSISTANT:  None.    ANESTHESIA:  GETA.    FINDINGS:  Edematous distended obviously acutely inflamed gallbladder from which 40 cc of bile had to be aspirated just to allow for grasping and retraction.    INDICATIONS:  Patient presents today with a recently diagnosed acute cholecystitis.  HIDA scan this morning revealed a nonvisualized gallbladder worrisome for the same.  After seeing the patient and reviewing the studies, I recommended consideration for laparoscopic cholecystectomy this admission with discharge home soon after.  The procedure, risks, benefits and alternatives were discussed including but not limited to bleeding, infection, opening, internal organ injury as well as the need for further surgery acutely or chronically down the line.  They expressed agreement and understanding.  Questions were answered to their satisfaction.    PROCEDURE:  Patient was taken to the operating room and placed supine on the operating room table. After establishment of adequate general endotracheal anesthesia, the abdomen was prepped and draped in normal sterile fashion.  The procedure was then initiated by making a 5 mm transverse infraumbilical incision through which a bladeless was trocar was inserted into the abdominal cavity.  After insufflation a camera was inserted and the abdomen cavity was inspected.  With no contraindications, I then placed under direct vision 3 subcostal trochars, an 11 mm subxiphoid trocar, and 2 lateral 5 mm trochars. Two blunt gallbladder graspers were then inserted and the gallbladder was dissected free and elevated.  I grabbed the fundus first and retracted superiorly and the Beckwith's pouch and retracted laterally.  This opened up the triangle of Calot.  Both blunt and sharp  dissection were then used to define the length of the gallbladder, the Beckwith's pouch and the triangle of Calot.  Once in the triangle, I then dissected out the cystic duct and artery.  Both were identified, completely cleared, clipped and divided.  With both structures divided and no other structures identified, and the anatomic relationships confirmed, I then began removing the gallbladder from its hepatic attachments using cautery and countertraction.  Once the gallbladder was completely  from the liver bed, it was then removed through the subxiphoid port without much difficulty.  The right upper quadrant was then irrigated with saline and inspected.  Once no evidence of continued bleeding or signs of bile leak were noted, instruments and trocars were removed and counted correctly.  Local anesthetic solution was injected into all the incisions which were then closed with 4-0 Vicryl subcuticular suture and then covered by steristrips and Band-Aids.  Anesthesia was reversed and the patient was taken recovery in satisfactory condition.    Anmol Walker M.D.

## 2017-03-08 NOTE — PLAN OF CARE
Problem: Patient Care Overview (Adult)  Goal: Plan of Care Review  Outcome: Outcome(s) achieved Date Met:  03/08/17 03/08/17 4708   Coping/Psychosocial Response Interventions   Plan Of Care Reviewed With patient   Patient Care Overview   Progress improving

## 2017-03-08 NOTE — PLAN OF CARE
Problem: Patient Care Overview (Adult)  Goal: Discharge Needs Assessment  Outcome: Ongoing (interventions implemented as appropriate)    03/08/17 0815   Discharge Needs Assessment   Concerns To Be Addressed no discharge needs identified   Equipment Needed After Discharge none   Self-Care   Equipment Currently Used at Home none

## 2017-03-08 NOTE — PLAN OF CARE
Problem: Patient Care Overview (Adult)  Goal: Plan of Care Review  Outcome: Ongoing (interventions implemented as appropriate)    03/08/17 1232   Coping/Psychosocial Response Interventions   Plan Of Care Reviewed With patient   Patient Care Overview   Progress improving       Goal: Adult Individualization and Mutuality  Outcome: Ongoing (interventions implemented as appropriate)  Goal: Discharge Needs Assessment  Outcome: Ongoing (interventions implemented as appropriate)

## 2017-03-08 NOTE — ANESTHESIA PREPROCEDURE EVALUATION
Anesthesia Evaluation     Patient summary reviewed and Nursing notes reviewed   NPO Status: > 8 hours   Airway   Mallampati: II  TM distance: >3 FB  Neck ROM: full  no difficulty expected  Dental - normal exam     Pulmonary - normal exam   (+) a smoker Current,   Cardiovascular - negative cardio ROS and normal exam  Exercise tolerance: good (4-7 METS)    ECG reviewed  Rhythm: regular  Rate: normal        Neuro/Psych  (+) headaches,    GI/Hepatic/Renal/Endo    (+)  GERD,     Musculoskeletal (-) negative ROS    Abdominal  - normal exam   Substance History - negative use     OB/GYN          Other - negative ROS                                   Anesthesia Plan    ASA 2     general     intravenous induction   Anesthetic plan and risks discussed with patient.

## 2017-03-08 NOTE — PLAN OF CARE
Problem: Perioperative Period (Adult)  Goal: Signs and Symptoms of Listed Potential Problems Will be Absent or Manageable (Perioperative Period)  Outcome: Outcome(s) achieved Date Met:  03/08/17 03/08/17 2226   Perioperative Period   Problems Assessed (Perioperative Period) all   Problems Present (Perioperative Period) none

## 2017-03-08 NOTE — PLAN OF CARE
Problem: Patient Care Overview (Adult)  Goal: Plan of Care Review  Outcome: Ongoing (interventions implemented as appropriate)    03/08/17 0451   Coping/Psychosocial Response Interventions   Plan Of Care Reviewed With patient   Outcome Evaluation   Outcome Summary/Follow up Plan No c/o abdominal pain, medicated x1 for HA. Continues on IVF, NPO since [place].   Patient Care Overview   Progress no change       Goal: Adult Individualization and Mutuality  Outcome: Ongoing (interventions implemented as appropriate)  Goal: Discharge Needs Assessment  Outcome: Ongoing (interventions implemented as appropriate)    Problem: Pain, Acute (Adult)  Goal: Identify Related Risk Factors and Signs and Symptoms  Outcome: Ongoing (interventions implemented as appropriate)  Goal: Acceptable Pain Control/Comfort Level  Outcome: Ongoing (interventions implemented as appropriate)    Problem: Cholecystitis/Cholecystectomy (Adult)  Goal: Signs and Symptoms of Listed Potential Problems Will be Absent or Manageable (Cholecystitis/Cholecystectomy)  Outcome: Ongoing (interventions implemented as appropriate)

## 2017-03-08 NOTE — PLAN OF CARE
Problem: Perioperative Period (Adult)  Goal: Signs and Symptoms of Listed Potential Problems Will be Absent or Manageable (Perioperative Period)  Outcome: Ongoing (interventions implemented as appropriate)    03/08/17 0906   Perioperative Period   Problems Assessed (Perioperative Period) all   Problems Present (Perioperative Period) none

## 2017-03-08 NOTE — PLAN OF CARE
Problem: Perioperative Period (Adult)  Goal: Signs and Symptoms of Listed Potential Problems Will be Absent or Manageable (Perioperative Period)  Outcome: Ongoing (interventions implemented as appropriate)    03/08/17 1231   Perioperative Period   Problems Assessed (Perioperative Period) pain;wound complications;embolism;hypothermia;hypoxia/hypoxemia   Problems Present (Perioperative Period) none

## 2017-03-08 NOTE — PLAN OF CARE
Problem: Pain, Acute (Adult)  Goal: Acceptable Pain Control/Comfort Level  Outcome: Outcome(s) achieved Date Met:  03/08/17 03/08/17 1351   Pain, Acute (Adult)   Acceptable Pain Control/Comfort Level achieves outcome

## 2017-03-08 NOTE — ANESTHESIA POSTPROCEDURE EVALUATION
Patient: Manuelito De Santiago    Procedure Summary     Date Anesthesia Start Anesthesia Stop Room / Location    03/08/17 1120 1208  [facility]  /  [facility]       Procedure Diagnosis Surgeon Provider    CHOLECYSTECTOMY LAPAROSCOPIC (N/A Abdomen) Acute cholecystitis  (Acute cholecystitis [K81.0]) MD Ryley Gómez MD          Anesthesia Type: general  Last vitals  /67 (03/08/17 1348)    Temp 36.3 °C (97.4 °F) (03/08/17 1330)    Pulse 69 (03/08/17 1348)   Resp 16 (03/08/17 1348)    SpO2 95 % (03/08/17 1348)      Post Anesthesia Care and Evaluation    Patient location during evaluation: bedside  Patient participation: complete - patient participated  Level of consciousness: awake  Pain score: 1  Pain management: adequate  Airway patency: patent  Anesthetic complications: No anesthetic complications    Cardiovascular status: acceptable  Respiratory status: acceptable  Hydration status: acceptable

## 2017-03-09 LAB
CYTO UR: NORMAL
LAB AP CASE REPORT: NORMAL
Lab: NORMAL
PATH REPORT.FINAL DX SPEC: NORMAL
PATH REPORT.GROSS SPEC: NORMAL

## 2017-03-15 NOTE — PAYOR COMM NOTE
"Manuelito So (61 y.o. Male)                                                   REF#[id number]                                  NO D/C SUM AVAILABLE                                    D/C ON 3/8/17      Date of Birth Social Security Number Address Home Phone MRN    1955  [address] [place] [id number] [phone] [id number]    [facility] Mar[facility] Status          None        Admission Date Admission Type Admitting Provider Attending Provider Department, Room/Bed    3/7/17 Emergency Anmol Walker MD  [facility], [facility]    Discharge Date Discharge Disposition Discharge Destination        3/8/2017 Home or Self Care             Attending Provider: (none)    Allergies:  No Known Allergies    Isolation:  None   Infection:  None   Code Status:  Prior    Ht:  70\" (177.8 cm)   Wt:  166 lb (75.3 kg)    Admission Cmt:  None   Principal Problem:  None                Active Insurance as of 3/7/2017     Primary Coverage     Payor Plan Insurance Group Employer/Plan Group    [employer] PPO [id number]     Payor Plan Address Payor Plan Phone Number Effective From Effective To    [address] [phone] 3/7/2017     [place] [address]       Subscriber Name Subscriber Birth Date Member ID       MANUELITO SO 1955 [id number]                 Emergency Contacts      (Rel.) Home Phone Work Phone Mobile Phone    Silvina So (Significant Other) [phone] -- --            Insurance Information                [employer]OSS/[employer] BLUE [employer] PPO Phone: [phone]    Subscriber: Anyi Manuelito ELIEZER Subscriber#: [id number]    Group#: [id number] Precert#:              Operative/Procedure Notes (all)      Anmol Walker MD at 3/8/2017 11:51 AM  Version 1 of 1 March 8, 2017    Manuelito So  [id number]    PROCEDURE:  Laparoscopic cholecystectomy.    PREOPERATIVE DIAGNOSIS:  acute " cholecystitis.    POSTOPERATIVE DIAGNOSIS:  same.     SURGEON:  Anmol Walker M.D.    ASSISTANT:  None.    ANESTHESIA:  GETA.    FINDINGS:  Edematous distended obviously acutely inflamed gallbladder from which 40 cc of bile had to be aspirated just to allow for grasping and retraction.    INDICATIONS:  Patient presents today with a recently diagnosed acute cholecystitis.  HIDA scan this morning revealed a nonvisualized gallbladder worrisome for the same.  After seeing the patient and reviewing the studies, I recommended consideration for laparoscopic cholecystectomy this admission with discharge home soon after.  The procedure, risks, benefits and alternatives were discussed including but not limited to bleeding, infection, opening, internal organ injury as well as the need for further surgery acutely or chronically down the line.  They expressed agreement and understanding.  Questions were answered to their satisfaction.    PROCEDURE:  Patient was taken to the operating room and placed supine on the operating room table. After establishment of adequate general endotracheal anesthesia, the abdomen was prepped and draped in normal sterile fashion.  The procedure was then initiated by making a 5 mm transverse infraumbilical incision through which a bladeless was trocar was inserted into the abdominal cavity.  After insufflation a camera was inserted and the abdomen cavity was inspected.  With no contraindications, I then placed under direct vision 3 subcostal trochars, an 11 mm subxiphoid trocar, and 2 lateral 5 mm trochars. Two blunt gallbladder graspers were then inserted and the gallbladder was dissected free and elevated.  I grabbed the fundus first and retracted superiorly and the Beckwith's pouch and retracted laterally.  This opened up the triangle of Calot.  Both blunt and sharp dissection were then used to define the length of the gallbladder, the Beckwith's pouch and the triangle of Calot.  Once in the  triangle, I then dissected out the cystic duct and artery.  Both were identified, completely cleared, clipped and divided.  With both structures divided and no other structures identified, and the anatomic relationships confirmed, I then began removing the gallbladder from its hepatic attachments using cautery and countertraction.  Once the gallbladder was completely  from the liver bed, it was then removed through the subxiphoid port without much difficulty.  The right upper quadrant was then irrigated with saline and inspected.  Once no evidence of continued bleeding or signs of bile leak were noted, instruments and trocars were removed and counted correctly.  Local anesthetic solution was injected into all the incisions which were then closed with 4-0 Vicryl subcuticular suture and then covered by steristrips and Band-Aids.  Anesthesia was reversed and the patient was taken recovery in satisfactory condition.    Anmol Walker M.D.     Electronically signed by Anmol Walker MD at 3/8/2017 11:52 AM

## 2017-03-15 NOTE — PAYOR COMM NOTE
"Maru So (61 y.o. Male)                                                                    REF# [id number]                                        PATIENT DISCHARGED   3/8/17                                          NO D/C  SUMMARY AVAILABLE                                Date of Birth Social Security Number Address Home Phone MRN    1955  [facility] [facility] [address] [phone] [id number]    [employer] Marital Status          None        Admission Date Admission Type Admitting Provider Attending Provider Department, Room/Bed    3/7/17 Emergency Anmol Walker MD  [facility] OR, [facility] OR    Discharge Date Discharge Disposition Discharge Destination        3/8/2017 Home or Self Care             Attending Provider: (none)    Allergies:  No Known Allergies    Isolation:  None   Infection:  None   Code Status:  Prior    Ht:  70\" (177.8 cm)   Wt:  166 lb (75.3 kg)    Admission Cmt:  None   Principal Problem:  None                Active Insurance as of 3/7/2017     Primary Coverage     Payor Plan Insurance Group Employer/Plan Group    [employer] PPO [id number]     Payor Plan Address Payor Plan Phone Number Effective From Effective To    [address] [phone] 3/7/2017     [place] [address]       Subscriber Name Subscriber Birth Date Member ID       MARU SO 1955 [id number]                 Emergency Contacts      (Rel.) Home Phone Work Phone Mobile Phone    Silvina So (Significant Other) [phone] -- --            Insurance Information                [employer] BLUE CROSS/[employer] BLUE [employer] PPO Phone: [phone]    Subscriber: Maru So Subscriber#: [id number]    Group#: [id number] Precert#:                "

## 2019-11-29 PROBLEM — J32.9 SINUSITIS, BACTERIAL: Status: ACTIVE | Noted: 2019-11-29

## 2019-11-29 PROBLEM — B96.89 SINUSITIS, BACTERIAL: Status: ACTIVE | Noted: 2019-11-29

## 2020-05-15 RX ORDER — LEVOTHYROXINE SODIUM 0.1 MG/1
100 TABLET ORAL NIGHTLY
COMMUNITY

## 2020-05-15 RX ORDER — MELOXICAM 15 MG/1
15 TABLET ORAL DAILY
COMMUNITY
End: 2020-09-17 | Stop reason: HOSPADM

## 2020-05-15 RX ORDER — SILDENAFIL CITRATE 20 MG/1
20 TABLET ORAL 3 TIMES DAILY
COMMUNITY
End: 2020-05-21

## 2020-05-15 RX ORDER — HYDROCODONE BITARTRATE AND ACETAMINOPHEN 5; 325 MG/1; MG/1
1 TABLET ORAL EVERY 6 HOURS PRN
COMMUNITY
End: 2020-05-21

## 2020-05-15 RX ORDER — ATORVASTATIN CALCIUM 10 MG/1
10 TABLET, FILM COATED ORAL NIGHTLY
COMMUNITY

## 2020-05-15 RX ORDER — CYCLOBENZAPRINE HCL 10 MG
10 TABLET ORAL 3 TIMES DAILY PRN
COMMUNITY
End: 2020-09-11

## 2020-05-15 RX ORDER — RIZATRIPTAN BENZOATE 10 MG/1
10 TABLET, ORALLY DISINTEGRATING ORAL ONCE AS NEEDED
COMMUNITY

## 2020-05-15 RX ORDER — TAMSULOSIN HYDROCHLORIDE 0.4 MG/1
1 CAPSULE ORAL NIGHTLY
COMMUNITY

## 2020-05-15 RX ORDER — OMEPRAZOLE 20 MG/1
20 CAPSULE, DELAYED RELEASE ORAL DAILY
COMMUNITY
End: 2020-05-21

## 2020-05-15 NOTE — PROGRESS NOTES
Subjective   History of Present Illness: Manuelito De Santiago is a 64 y.o. male is being seen for consultation today at the request of Reji Moyer MD for constant moderate back pain that radiates into bilateral legs; right greater than left. He reports pain in his posterior right buttock, thigh to his knee. He reports tingling in bilateral legs. He is having numbness in his right foot. He denies bladder/bowl incontinence. He is being followed by the pain institute for pain management. He has had a L-ZARA x1 with no relief. He is taking oral narcotics as well. He is taking Gabapentin 300 mg TID and has noticed mild relief with nerve pain.  He has another epidural scheduled next week.  Despite his symptoms he has been able to maintain a fairly active lifestyle but has been troubled by this since his prison in late January 2020.    While in the room and during my examination of the patient I wore a mask and eye protection.  I washed my hands before and after this patient encounter.  The patient was also wearing a mask.    Back Pain   The problem occurs constantly. The problem has been gradually worsening since onset. The pain is present in the lumbar spine. The quality of the pain is described as aching. The pain radiates to the right thigh, right knee, right foot, left thigh, left knee and left foot. The symptoms are aggravated by position, lying down, sitting and standing. Associated symptoms include headaches, leg pain, numbness (right foot), tingling (bilateral legs) and weakness. Pertinent negatives include no bladder incontinence, bowel incontinence, chest pain or fever. He has tried NSAIDs (L-ESIx1, oral narcotics) for the symptoms.       The following portions of the patient's history were reviewed and updated as appropriate: allergies, current medications, past family history, past medical history, past social history, past surgical history and problem list.    While in the room and during my examination  "of the patient I wore a mask and eye protection.  I washed my hands before and after this patient encounter.  The patient was also wearing a mask.    Review of Systems   Constitutional: Positive for activity change. Negative for fever.   HENT: Negative for congestion.    Eyes: Negative.  Negative for visual disturbance.   Respiratory: Negative.  Negative for chest tightness and shortness of breath.    Cardiovascular: Negative.  Negative for chest pain.   Gastrointestinal: Negative.  Negative for bowel incontinence and nausea.   Endocrine: Negative.  Negative for cold intolerance and heat intolerance.   Genitourinary: Positive for urgency. Negative for bladder incontinence.   Musculoskeletal: Positive for back pain, gait problem and neck pain.   Skin: Negative.  Negative for rash.   Allergic/Immunologic: Positive for environmental allergies.   Neurological: Positive for tingling (bilateral legs), weakness, numbness (right foot) and headaches.   Hematological: Negative.  Does not bruise/bleed easily.   Psychiatric/Behavioral: Positive for sleep disturbance.       Objective     Vitals:    05/21/20 1129   BP: 135/74   Pulse: 75   Temp: 96.2 °F (35.7 °C)   Weight: 78.9 kg (174 lb)   Height: 177.8 cm (70\")     Body mass index is 24.97 kg/m².      Physical Exam  Neurologic Exam     Physical Exam:    CONSTITUTIONAL: This 64 year old right handed  male appears well developed, well-nourished and in no acute distress.    HEAD & FACE: the head and face are symmetric, normocephalic and atraumatic.    EYES: Inspection of the conjunctivae and lids reveals no swelling, erythema or discharge.  Pupils are round, equal and reactive to light and there is no scleral icterus.    EARS, NOSE, MOUTH & THROAT: On inspection, the ears and nose are within normal limits.    NECK: the neck is supple and symmetric. The trachea is midline with no masses.    PULMONARY: Respiratory effort is normal with no increased work of breathing or " signs of respiratory distress.    CARDIOVASCULAR: Pedal pulses are +2/4 bilaterally. Examination of the extremities shows no edema or varicosities.    LYMPHATIC: There is no palpable lymphadenopathy of the neck.    MUSCULOSKELETAL: Gait and station are within normal limits. The spine has normal alignment and range of motion, there is no palpable abnormality lumbar spine and no tenderness    SKIN: The skin is warm, dry and intact    NEUROLOGIC:   Cranial Nerves 2-12 intact  Normal motor strength noted. Muscle bulk and tone are normal.  Sensory exam is normal to all modalities.  Reflexes on the right side demonstrates 2/4 Triceps Reflex, 2/4 Biceps Reflex, 2/4 Brachioradialis Reflex, 2/4 Knee Jerk Reflex, 1/4 Ankle Jerk Reflex and no ankle clonus on the right.   Reflexes on the left side demonstrates 2/4 Triceps Reflex, 2/4 Biceps Reflex, 2/4 Brachioradialis Reflex, 2/4 Knee Jerk Reflex, 1/4 Ankle Jerk Reflex and no ankle clonus on the left.  Superficial/Primitive Reflexes: primitive reflexes were absent.  Finch's, Babinski clonus are negative  No coordination deficit observed.  Radicular testing showed a negative Jay (MARIELLA) test and positive straight leg raise on the right.  Cortical function is intact and without deficits. Speech is normal.    PSYCHIATRIC: oriented to person, place and time. Patient's mood and affect are normal.      Assessment/Plan   Independent Review of Radiographic Studies:      I personally reviewed the images from the following studies.    MRI of the lumbar spine done at [facility] on April 22, 2020 reveals multilevel spondylolisthesis of 2 mm of L3 on 4 and 2 mm of L4 on 5.  Marrow edema is present at the right L5 pedicle and the left facet joint as well as the right L4 pedicle suggesting stress change.  L5-S1 reveals severe disc desiccation with spondylitic change causing potential contact of both exiting L5 nerve roots.  Due to the spondylolisthesis at L3-4 and L4-5 there is the  potential for nerve root encroachment at those 2 levels bilaterally as well.    Medical Decision Making:      He has multilevel spinal stenosis and lateral recess stenosis due to spondylitic change particularly at L3-4 and L4-5 and to some degree L5-S1.  He does have a spondylolisthesis although my review of the films does not suggest that it is as significant as the radiologist mentions.  In fact I think the degree of spondylosis likely stiff in his spine such that he is not unstable.  Regardless of any 2 pieces of information since he has not responded to conservative measures and is starting to get lower extremity complaints that is a primary medical clearance and flexion-extension lumbar films.    He intends to get a second epidural next week.  It is not uncommon for patients to not get a positive result from 1 injection but get a dramatic result from 2 injections so we will see what he gets from that result in addition to the primary medical clearance and flexion-extension lumbar films.  If he goes on to require an operative intervention I would hope he would only require decompressive surgery but if there is instability I will need to send him to a spinal subspecialist to consider stabilization.    Return for review of completed workup and discussion of results of pain management.    Manuelito was seen today for back pain.    Diagnoses and all orders for this visit:    Spinal stenosis, lumbar region, with neurogenic claudication  -     XR Spine Lumbar Complete With Flex & Ext; Future  -     Ambulatory Referral to Family Practice    Sciatica of right side  -     XR Spine Lumbar Complete With Flex & Ext; Future  -     Ambulatory Referral to Family Practice    Spondylolisthesis of lumbar region  -     XR Spine Lumbar Complete With Flex & Ext; Future  -     Ambulatory Referral to Family Practice      Addendum later on May 21, 2020 the patient went for his flexion-extension films upon leaving my office today which  showed the slight spondylolisthesis of L3-4 and L4-5 but no motion with flexion/extension.       Dwight Mcdermott MD FACS FAANS  Neurological Surgery

## 2020-05-21 ENCOUNTER — HOSPITAL ENCOUNTER (OUTPATIENT)
Dept: GENERAL RADIOLOGY | Facility: HOSPITAL | Age: 65
Discharge: HOME | End: 2020-05-21
Admitting: NEUROLOGICAL SURGERY

## 2020-05-21 ENCOUNTER — OFFICE VISIT (OUTPATIENT)
Dept: NEUROSURGERY | Facility: CLINIC | Age: 65
End: 2020-05-21

## 2020-05-21 VITALS
DIASTOLIC BLOOD PRESSURE: 74 MMHG | HEART RATE: 75 BPM | BODY MASS INDEX: 24.91 KG/M2 | WEIGHT: 174 LBS | SYSTOLIC BLOOD PRESSURE: 135 MMHG | HEIGHT: 70 IN | TEMPERATURE: 96.2 F

## 2020-05-21 DIAGNOSIS — M54.31 SCIATICA OF RIGHT SIDE: ICD-10-CM

## 2020-05-21 DIAGNOSIS — M48.062 SPINAL STENOSIS, LUMBAR REGION, WITH NEUROGENIC CLAUDICATION: ICD-10-CM

## 2020-05-21 DIAGNOSIS — M48.062 SPINAL STENOSIS, LUMBAR REGION, WITH NEUROGENIC CLAUDICATION: Primary | ICD-10-CM

## 2020-05-21 DIAGNOSIS — M43.16 SPONDYLOLISTHESIS OF LUMBAR REGION: ICD-10-CM

## 2020-05-21 PROCEDURE — 99204 OFFICE O/P NEW MOD 45 MIN: CPT | Performed by: NEUROLOGICAL SURGERY

## 2020-05-21 PROCEDURE — 72114 X-RAY EXAM L-S SPINE BENDING: CPT

## 2020-06-03 NOTE — PROGRESS NOTES
Subjective   History of Present Illness: Manuelito De Santiago is a 64 y.o. male is here today for follow-up with a new lumbar xray. Mr. De Santiago was last seen on 05/21/20 with complaints of moderate back pain that radiates into bilateral legs; right greater than left. He also reported tingling in bilateral legs and numbness in his right foot. Today, Mr. De Santiago reports intermittent back pain that radiates into bilateral legs. He reports constant numbness in bilateral feet; right greater than left.  Feels like the gabapentin prescribed by his primary physician has helped him more than the epidurals or anything else.  He does not feel like his lifestyle is limited at this point although if he does walk about half a mile or longer he does get recurrent right leg pain.  At this point he wants to see how he is able to manage things through the summer and come back to talk to me again in 3 months.    While in the room and during my examination of the patient I wore a mask and eye protection.  I washed my hands before and after this patient encounter.  The patient was also wearing a mask.    Back Pain   The problem occurs intermittently. The problem is unchanged. The pain is present in the lumbar spine. The quality of the pain is described as aching, burning, cramping, shooting and stabbing. The pain radiates to the right thigh, right knee, right foot, left thigh, left knee and left foot. The pain is at a severity of 4/10. The pain is moderate. The symptoms are aggravated by position. Stiffness is present in the morning and at night. Associated symptoms include leg pain and numbness. Pertinent negatives include no bladder incontinence, bowel incontinence, chest pain, tingling or weakness. He has tried NSAIDs (L-ESIx1 and oral narcotics) for the symptoms.       The following portions of the patient's history were reviewed and updated as appropriate: allergies, current medications, past family history, past medical history, past  "social history, past surgical history and problem list.    Review of Systems   Constitutional: Positive for activity change.   Respiratory: Negative for chest tightness and shortness of breath.    Cardiovascular: Negative for chest pain.   Gastrointestinal: Negative for bowel incontinence.   Genitourinary: Negative for bladder incontinence.   Musculoskeletal: Positive for back pain.   Neurological: Positive for numbness. Negative for tingling and weakness.       Objective     Vitals:    06/09/20 1221   BP: 139/74   Pulse: 68   Temp: 99.5 °F (37.5 °C)   Weight: 78.9 kg (174 lb)   Height: 177.8 cm (70\")     Body mass index is 24.97 kg/m².      Physical Exam  Neurologic Exam     Physical Exam:    CONSTITUTIONAL:  appears well developed, well-nourished and in no acute distress.    NECK: the neck is supple and symmetric. The trachea is midline with no masses.      PULMONARY: Respiratory effort is normal with no increased work of breathing or signs of respiratory distress.    CARDIOVASCULAR: Pedal pulses are +2/4 bilaterally. Examination of the extremities shows no edema or varicosities.    MUSCULOSKELETAL: Gait he does not demonstrate any lumbago today.  He is nontender to palpation of the lumbar spine    SKIN: The skin is warm, dry and intact.      NEUROLOGIC:   Normal motor strength noted. Muscle bulk and tone are normal.  Sensory exam is normal to all modalities.  Straight raising test Jay sign is negative today which is improved from my initial exam in May 2020.  Reflexes 2/4 at the knees 1/4 at the ankles symmetrical.  Cortical function is intact and without deficits. Speech is normal.    PSYCHIATRIC: oriented to person, place and time. Patient's mood and affect are normal.    Assessment/Plan   Independent Review of Radiographic Studies:      I personally reviewed the images from the following studies.    MRI of the lumbar spine done at [facility] on April 22, 2020 reveals multilevel spondylolisthesis of 2 " mm of L3 on 4 and 2 mm of L4 on 5.  Marrow edema is present at the right L5 pedicle and the left facet joint as well as the right L4 pedicle suggesting stress change.  L5-S1 reveals severe disc desiccation with spondylitic change causing potential contact of both exiting L5 nerve roots.  Due to the spondylolisthesis at L3-4 and L4-5 there is the potential for nerve root encroachment at those 2 levels bilaterally as well.    Plain films of the lumbar spine were done on May 21, 2020 at [facility] and revealed a 2 mm anterolisthesis of L4 on L5 and mild 3 mm of retrolisthesis of T12 on L1 without any change between flexion/extension motion.    Medical Decision Making:      He is satisfied with the improvement he has had 3 interventional pain management and medical management through his primary physician.  He is able to do the things he needs to do without significant limitation or pain.  He does still get some right lower extremity symptoms with more activity but he is able to manage that without having to consider surgery.  I told him that the flexion-extension films of his lumbar spine revealed stability of the slight deformities and therefore if he does develop recurrent neurogenic claudication or sciatica and we have to consider surgery he would not require a spinal fusion.    At this point he is hopeful to try to avoid any surgical intervention and wants to continue with the medical management only.  We will see him back in 3 months.    Return in about 3 months (around 9/9/2020) for recheck.    Manuelito was seen today for follow-up and back pain.    Diagnoses and all orders for this visit:    Spinal stenosis, lumbar region, with neurogenic claudication    Spondylolisthesis of lumbar region             Dwight Mcdermott MD FACS FAANS  Neurological Surgery

## 2020-06-09 ENCOUNTER — OFFICE VISIT (OUTPATIENT)
Dept: NEUROSURGERY | Facility: CLINIC | Age: 65
End: 2020-06-09

## 2020-06-09 VITALS
HEIGHT: 70 IN | WEIGHT: 174 LBS | HEART RATE: 68 BPM | TEMPERATURE: 99.5 F | SYSTOLIC BLOOD PRESSURE: 139 MMHG | BODY MASS INDEX: 24.91 KG/M2 | DIASTOLIC BLOOD PRESSURE: 74 MMHG

## 2020-06-09 DIAGNOSIS — M43.16 SPONDYLOLISTHESIS OF LUMBAR REGION: ICD-10-CM

## 2020-06-09 DIAGNOSIS — M48.062 SPINAL STENOSIS, LUMBAR REGION, WITH NEUROGENIC CLAUDICATION: Primary | ICD-10-CM

## 2020-06-09 PROCEDURE — 99213 OFFICE O/P EST LOW 20 MIN: CPT | Performed by: NEUROLOGICAL SURGERY

## 2020-09-04 NOTE — PROGRESS NOTES
Subjective   History of Present Illness: Manuelito De Santiago is a 65 y.o. male is here today for 3 month follow-up. He was last seen on 06/09/20 with complaints of intermittent back pain that radiates into bilateral legs. Today, Mr. DeS antiago reports constant back pain but the severity changes.  Over the summer since his conservative treatments he has developed progressive worsening of the lower extremity pain that he can only walk 100 feet without having to stop because of the pain he gets in one or both legs.  He describes a positive grocery cart sign when trying to get through a department store.  He reports bilateral leg pain with numbness, tingling and weakness. He denies bladder/bowel incontinence. He does reports urinary urgency. He is taking Norco 7.5-325 mg q4-6hrs PRN and Flexeril 10 mg TID PRN.  His lifestyle is significantly limited once again despite the medical and conservative management.    While in the room and during my examination of the patient I wore a mask and eye protection.  I washed my hands before and after this patient encounter.  The patient was also wearing a mask.    Back Pain   The problem occurs constantly. The problem has been gradually worsening since onset. The pain is present in the lumbar spine. The quality of the pain is described as aching. The pain radiates to the right thigh, right knee, right foot, left thigh, left knee and left foot. The pain is the same all the time. The symptoms are aggravated by position and standing (walking). Associated symptoms include leg pain, numbness and weakness. Pertinent negatives include no bladder incontinence, bowel incontinence or chest pain. He has tried muscle relaxant (oral narcotics and injections) for the symptoms.       The following portions of the patient's history were reviewed and updated as appropriate: allergies, current medications, past family history, past medical history, past social history, past surgical history and problem  "list.    Review of Systems   Constitutional: Positive for activity change.   Respiratory: Negative for chest tightness and shortness of breath.    Cardiovascular: Negative for chest pain.   Gastrointestinal: Negative for bowel incontinence.   Genitourinary: Negative for bladder incontinence.   Musculoskeletal: Positive for back pain.   Neurological: Positive for weakness and numbness.       Objective     Vitals:    09/10/20 1008   BP: 118/73   Pulse: 81   Temp: 99.1 °F (37.3 °C)   Weight: 78.9 kg (174 lb)   Height: 177.8 cm (70\")     Body mass index is 24.97 kg/m².      Physical Exam  Neurologic Exam    Physical Exam:    CONSTITUTIONAL:  appears well developed, well-nourished and in no acute distress.    NECK: the neck is supple and symmetric. The trachea is midline with no masses.      PULMONARY: Respiratory effort is normal with no increased work of breathing or signs of respiratory distress.    CARDIOVASCULAR: Pedal pulses are +2/4 bilaterally. Examination of the extremities shows no edema or varicosities.    MUSCULOSKELETAL: Gait with a lumbago style gait with a bent over posture    SKIN: The skin is warm, dry and intact.      NEUROLOGIC:   Cranial Nerves 2-12 intact  Normal motor strength noted. Muscle bulk and tone are normal.  Sensory exam is normal to all modalities.  Reflexes on the right side demonstrates 2/4 Triceps Reflex, 2/4 Biceps Reflex, 2/4 Brachioradialis Reflex, 2/4 Knee Jerk Reflex, 1/4 Ankle Jerk Reflex and no ankle clonus on the right.   Reflexes on the left side demonstrates 2/4 Triceps Reflex, 2/4 Biceps Reflex, 2/4 Brachioradialis Reflex, 2/4 Knee Jerk Reflex, 1/4 Ankle Jerk Reflex and no ankle clonus on the left.  Superficial/Primitive Reflexes: primitive reflexes were absent.  Finch's, Babinski clonus are negative  No coordination deficit observed.  Radicular testing showed a negative Jay (MARIELLA) test and  negative straight leg raise  laterally t.  Cortical function is intact and " without deficits. Speech is normal.    PSYCHIATRIC: oriented to person, place and time. Patient's mood and affect are normal.      Assessment/Plan   Independent Review of Radiographic Studies:      I personally reviewed the images from the following studies.    MRI of the lumbar spine done at [facility] on April 22, 2020 reveals multilevel spondylolisthesis of 2 mm of L3 on 4 and 2 mm of L4 on 5.  Marrow edema is present at the right L5 pedicle and the left facet joint as well as the right L4 pedicle suggesting stress change.  L5-S1 reveals severe disc desiccation with spondylitic change causing potential contact of both exiting L5 nerve roots.  Due to the spondylolisthesis at L3-4 and L4-5 there is the potential for nerve root encroachment at those 2 levels bilaterally as well.     Plain films of the lumbar spine were done on May 21, 2020 at [facility] and revealed a 2 mm anterolisthesis of L4 on L5 and mild 3 mm of retrolisthesis of T12 on L1 without any change between flexion/extension motion.    Medical Decision Making:      He has progressed to develop intractable neurogenic claudication at fairly short distances up to 100 feet.  We know he has severe lumbar stenosis at L3-4 and L4-5 and flexion-extension films revealed although he has some areas of slight spondylolisthesis he is stable with motion.  Therefore he is now a candidate for an L3-L5 lumbar laminectomy for decompression at L3-4, L4-5 and L5-S1.    A Lumbar Laminectomy involves a small skin incision localized over the affected spinal area which is confirmed by X-ray. Then the thickened bone is removed off the back of the spine with a drill to expose the nerves. Any thickened bone along the disc or joints are removed also to make more room around the nerves.    The patient understands that there are inherent risks to surgery including bleeding, infection, anesthetic risk, death, heart attack, stroke, damage to nerves, weakness, numbness,  paralysis, failure to improve, need for further surgery, CSF leak, recurrence, persistent pain, and any other unforeseen complications. They comprehend and had opportunity to ask further questions.    Return for follow-up after surgery.    Manuelito was seen today for follow-up and back pain.    Diagnoses and all orders for this visit:    Spinal stenosis, lumbar region, with neurogenic claudication  -     Case Request; Standing  -     Case Request    Spondylolisthesis of lumbar region  -     Case Request; Standing  -     Case Request    Other orders  -     Follow anesthesia standing orders.  -     Obtain informed consent  -     Provide NPO Instructions to Patient; Future             Dwight Mcdermott MD FACS [facility]  Neurological Surgery

## 2020-09-10 ENCOUNTER — OFFICE VISIT (OUTPATIENT)
Dept: NEUROSURGERY | Facility: CLINIC | Age: 65
End: 2020-09-10

## 2020-09-10 VITALS
BODY MASS INDEX: 24.91 KG/M2 | SYSTOLIC BLOOD PRESSURE: 118 MMHG | TEMPERATURE: 99.1 F | HEIGHT: 70 IN | DIASTOLIC BLOOD PRESSURE: 73 MMHG | WEIGHT: 174 LBS | HEART RATE: 81 BPM

## 2020-09-10 DIAGNOSIS — M48.062 SPINAL STENOSIS, LUMBAR REGION, WITH NEUROGENIC CLAUDICATION: Primary | ICD-10-CM

## 2020-09-10 DIAGNOSIS — M43.16 SPONDYLOLISTHESIS OF LUMBAR REGION: ICD-10-CM

## 2020-09-10 PROCEDURE — 99214 OFFICE O/P EST MOD 30 MIN: CPT | Performed by: NEUROLOGICAL SURGERY

## 2020-09-10 RX ORDER — CEFAZOLIN SODIUM 2 G/100ML
2 INJECTION, SOLUTION INTRAVENOUS ONCE
Status: CANCELLED | OUTPATIENT
Start: 2020-09-16 | End: 2020-09-10

## 2020-09-11 ENCOUNTER — TRANSCRIBE ORDERS (OUTPATIENT)
Dept: PREADMISSION TESTING | Facility: HOSPITAL | Age: 65
End: 2020-09-11

## 2020-09-11 ENCOUNTER — APPOINTMENT (OUTPATIENT)
Dept: PREADMISSION TESTING | Facility: HOSPITAL | Age: 65
End: 2020-09-11

## 2020-09-11 VITALS
WEIGHT: 161 LBS | BODY MASS INDEX: 23.05 KG/M2 | OXYGEN SATURATION: 98 % | TEMPERATURE: 98 F | SYSTOLIC BLOOD PRESSURE: 147 MMHG | RESPIRATION RATE: 16 BRPM | HEART RATE: 80 BPM | DIASTOLIC BLOOD PRESSURE: 75 MMHG | HEIGHT: 70 IN

## 2020-09-11 DIAGNOSIS — Z01.818 OTHER SPECIFIED PRE-OPERATIVE EXAMINATION: Primary | ICD-10-CM

## 2020-09-11 LAB
ANION GAP SERPL CALCULATED.3IONS-SCNC: 8.1 MMOL/L (ref 5–15)
BUN SERPL-MCNC: 15 MG/DL (ref 8–23)
BUN/CREAT SERPL: 15.2 (ref 7–25)
CALCIUM SPEC-SCNC: 9.4 MG/DL (ref 8.6–10.5)
CHLORIDE SERPL-SCNC: 105 MMOL/L (ref 98–107)
CO2 SERPL-SCNC: 27.9 MMOL/L (ref 22–29)
CREAT SERPL-MCNC: 0.99 MG/DL (ref 0.76–1.27)
DEPRECATED RDW RBC AUTO: 44.3 FL (ref 37–54)
ERYTHROCYTE [DISTWIDTH] IN BLOOD BY AUTOMATED COUNT: 13 % (ref 12.3–15.4)
GFR SERPL CREATININE-BSD FRML MDRD: 76 ML/MIN/1.73
GLUCOSE SERPL-MCNC: 91 MG/DL (ref 65–99)
HCT VFR BLD AUTO: 45.5 % (ref 37.5–51)
HGB BLD-MCNC: 14.5 G/DL (ref 13–17.7)
MCH RBC QN AUTO: 29.4 PG (ref 26.6–33)
MCHC RBC AUTO-ENTMCNC: 31.9 G/DL (ref 31.5–35.7)
MCV RBC AUTO: 92.1 FL (ref 79–97)
PLATELET # BLD AUTO: 152 10*3/MM3 (ref 140–450)
PMV BLD AUTO: 10.1 FL (ref 6–12)
POTASSIUM SERPL-SCNC: 4.5 MMOL/L (ref 3.5–5.2)
RBC # BLD AUTO: 4.94 10*6/MM3 (ref 4.14–5.8)
SODIUM SERPL-SCNC: 141 MMOL/L (ref 136–145)
WBC # BLD AUTO: 7.49 10*3/MM3 (ref 3.4–10.8)

## 2020-09-11 PROCEDURE — 93010 ELECTROCARDIOGRAM REPORT: CPT | Performed by: INTERNAL MEDICINE

## 2020-09-11 PROCEDURE — 80048 BASIC METABOLIC PNL TOTAL CA: CPT | Performed by: NEUROLOGICAL SURGERY

## 2020-09-11 PROCEDURE — 36415 COLL VENOUS BLD VENIPUNCTURE: CPT

## 2020-09-11 PROCEDURE — 93005 ELECTROCARDIOGRAM TRACING: CPT

## 2020-09-11 PROCEDURE — 85027 COMPLETE CBC AUTOMATED: CPT | Performed by: NEUROLOGICAL SURGERY

## 2020-09-11 RX ORDER — OMEPRAZOLE 20 MG/1
20 CAPSULE, DELAYED RELEASE ORAL DAILY
COMMUNITY

## 2020-09-11 NOTE — DISCHARGE INSTRUCTIONS
Take the following medications the morning of surgery:  NEURONTIN, OMEPRAZOLE    ARRIVE TO MAIN OR DESK 9/16/2020 AT 8:00AM    If you are on prescription narcotic pain medication to control your pain you may also take that medication the morning of surgery.    General Instructions:  • Do not eat solid food after midnight the night before surgery.  • You may drink clear liquids day of surgery but must stop at least one hour before your hospital arrival time.(7:00AM)  • It is beneficial for you to have a clear drink that contains carbohydrates the day of surgery.  We suggest a 12 to 20 ounce bottle of Gatorade or Powerade for non-diabetic patients or a 12 to 20 ounce bottle of G2 or Powerade Zero for diabetic patients. (Pediatric patients, are not advised to drink a 12 to 20 ounce carbohydrate drink)    Clear liquids are liquids you can see through.  Nothing red in color.     Plain water                               Sports drinks  Sodas                                   Gelatin (Jell-O)  Fruit juices without pulp such as white grape juice and apple juice  Popsicles that contain no fruit or yogurt  Tea or coffee (no cream or milk added)  Gatorade / Powerade  G2 / Powerade Zero    • Infants may have breast milk up to four hours before surgery.  • Infants drinking formula may drink formula up to six hours before surgery.   • Patients who avoid smoking, chewing tobacco and alcohol for 4 weeks prior to surgery have a reduced risk of post-operative complications.  Quit smoking as many days before surgery as you can.  • Do not smoke, use chewing tobacco or drink alcohol the day of surgery.   • If applicable bring your C-PAP/ BI-PAP machine.  • Bring any papers given to you in the doctor’s office.  • Wear clean comfortable clothes.  • Do not wear contact lenses, false eyelashes or make-up.  Bring a case for your glasses.   • Bring crutches or walker if applicable.  • Remove all piercings.  Leave jewelry and any other  valuables at home.  • Hair extensions with metal clips must be removed prior to surgery.  • The Pre-Admission Testing nurse will instruct you to bring medications if unable to obtain an accurate list in Pre-Admission Testing.        Preventing a Surgical Site Infection:  • For 2 to 3 days before surgery, avoid shaving with a razor because the razor can irritate skin and make it easier to develop an infection.    • Any areas of open skin can increase the risk of a post-operative wound infection by allowing bacteria to enter and travel throughout the body.  Notify your surgeon if you have any skin wounds / rashes even if it is not near the expected surgical site.  The area will need assessed to determine if surgery should be delayed until it is healed.  • The night prior to surgery shower using a fresh bar of anti-bacterial soap (such as Dial) and clean washcloth.  Sleep in a clean bed with clean clothing.  Do not allow pets to sleep with you.  • Shower on the morning of surgery using a fresh bar of anti-bacterial soap (such as Dial) and clean washcloth.  Dry with a clean towel and dress in clean clothing.  • Ask your surgeon if you will be receiving antibiotics prior to surgery.  • Make sure you, your family, and all healthcare providers clean their hands with soap and water or an alcohol based hand  before caring for you or your wound.    Day of surgery:  Your arrival time is approximately two hours before your scheduled surgery time.  Upon arrival, a Pre-op nurse and Anesthesiologist will review your health history, obtain vital signs, and answer questions you may have.  The only belongings needed at this time will be a list of your home medications and if applicable your C-PAP/BI-PAP machine.  If you are staying overnight your family can leave the rest of your belongings in the car and bring them to your room later.  A Pre-op nurse will start an IV and you may receive medication in preparation for  surgery, including something to help you relax.  Your family will be able to see you in the Pre-op area.  Two visitors at a time will be allowed in the Pre-op room.  While you are in surgery your family should notify the waiting room  if they leave the waiting room area and provide a contact phone number.    Please be aware that surgery does come with discomfort.  We want to make every effort to control your discomfort so please discuss any uncontrolled symptoms with your nurse.   Your doctor will most likely have prescribed pain medications.      If you are going home after surgery you will receive individualized written care instructions before being discharged.  A responsible adult must drive you to and from the hospital on the day of your surgery and stay with you for 24 hours.    If you are staying overnight following surgery, you will be transported to your hospital room following the recovery period.  [facility] has all private rooms.    If you have any questions please call Pre-Admission Testing at [phone].  Deductibles and co-payments are collected on the day of service. Please be prepared to pay the required co-pay, deductible or deposit on the day of service as defined by your plan.    Patient Education for Self-Quarantine Process    Following your COVID testing, we strongly recommend that you do not leave your home after you have been tested for COVID except to get medical care. This includes not going to work, school or to public areas.  If this is not possible for you to do please limit your activities to only required outings.  Be sure to wear a mask when you are with other people, practice social distancing and wash your hands frequently.      The following items provide additional details to keep you safe.  • Wash your hands with soap and water frequently for at least 20 seconds.   • Avoid touching your eyes, nose and mouth with unwashed hands.  • Do not share  anything - utensils, towels, food from the same bowl.   • Have your own utensils, drinking glass, dishes, towels and bedding.   • Do not have visitors.   • Do use FaceTime to stay in touch with family and friends.  • You should stay in a specific room away from others if possible.   • Stay at least 6 feet away from others in the home if you cannot have a dedicated room to yourself.   • Do not snuggle with your pet. While the CDC says there is no evidence that pets can spread COVID-19 or be infected from humans, it is probably best to avoid “petting, snuggling, being kissed or licked and sharing food (during self-quarantine)”, according to the CDC.   • Sanitize household surfaces daily. Include all high touch areas (door handles, light switches, phones, countertops, etc.)  • Do not share a bathroom with others, if possible.   • Wear a mask around others in your home if you are unable to stay in a separate room or 6 feet apart. If  you are unable to wear a mask, have your family member wear a mask if they must be within 6 feet of you.   Call your surgeon immediately if you experience any of the following symptoms:  • Sore Throat  • Shortness of Breath or difficulty breathing  • Cough  • Chills  • Body soreness or muscle pain  • Headache  • Fever  • New loss of taste or smell  • Do not arrive for your surgery ill.  Your procedure will need to be rescheduled to another time.  You will need to call your physician before the day of surgery to avoid any unnecessary exposure to hospital staff as well as other patients.

## 2020-09-14 ENCOUNTER — LAB (OUTPATIENT)
Dept: LAB | Facility: HOSPITAL | Age: 65
End: 2020-09-14

## 2020-09-14 DIAGNOSIS — Z01.818 OTHER SPECIFIED PRE-OPERATIVE EXAMINATION: ICD-10-CM

## 2020-09-14 PROCEDURE — C9803 HOPD COVID-19 SPEC COLLECT: HCPCS

## 2020-09-14 PROCEDURE — U0004 COV-19 TEST NON-CDC HGH THRU: HCPCS

## 2020-09-15 LAB — SARS-COV-2 RNA RESP QL NAA+PROBE: NOT DETECTED

## 2020-09-15 NOTE — H&P
Subjective   History of Present Illness: Manuelito De Santiago is a 65 y.o. male is here today for 3 month follow-up. He was last seen on 06/09/20 with complaints of intermittent back pain that radiates into bilateral legs. Today, Mr. De Santiago reports constant back pain but the severity changes.  Over the summer since his conservative treatments he has developed progressive worsening of the lower extremity pain that he can only walk 100 feet without having to stop because of the pain he gets in one or both legs.  He describes a positive grocery cart sign when trying to get through a department store.  He reports bilateral leg pain with numbness, tingling and weakness. He denies bladder/bowel incontinence. He does reports urinary urgency. He is taking Norco 7.5-325 mg q4-6hrs PRN and Flexeril 10 mg TID PRN.  His lifestyle is significantly limited once again despite the medical and conservative management.    While in the room and during my examination of the patient I wore a mask and eye protection.  I washed my hands before and after this patient encounter.  The patient was also wearing a mask.    Back Pain   The problem occurs constantly. The problem has been gradually worsening since onset. The pain is present in the lumbar spine. The quality of the pain is described as aching. The pain radiates to the right thigh, right knee, right foot, left thigh, left knee and left foot. The pain is the same all the time. The symptoms are aggravated by position and standing (walking). Associated symptoms include leg pain, numbness and weakness. Pertinent negatives include no bladder incontinence, bowel incontinence or chest pain. He has tried muscle relaxant (oral narcotics and injections) for the symptoms.       The following portions of the patient's history were reviewed and updated as appropriate: allergies, current medications, past family history, past medical history, past social history, past surgical history and problem  "list.    Review of Systems   Constitutional: Positive for activity change.   Respiratory: Negative for chest tightness and shortness of breath.    Cardiovascular: Negative for chest pain.   Gastrointestinal: Negative for bowel incontinence.   Genitourinary: Negative for bladder incontinence.   Musculoskeletal: Positive for back pain.   Neurological: Positive for weakness and numbness.       Objective     Vitals:    09/10/20 1008   BP: 118/73   Pulse: 81   Temp: 99.1 °F (37.3 °C)   Weight: 78.9 kg (174 lb)   Height: 177.8 cm (70\")     Body mass index is 24.97 kg/m².      Physical Exam  Neurologic Exam    Physical Exam:    CONSTITUTIONAL:  appears well developed, well-nourished and in no acute distress.    NECK: the neck is supple and symmetric. The trachea is midline with no masses.      PULMONARY: Respiratory effort is normal with no increased work of breathing or signs of respiratory distress.    CARDIOVASCULAR: Pedal pulses are +2/4 bilaterally. Examination of the extremities shows no edema or varicosities.    MUSCULOSKELETAL: Gait with a lumbago style gait with a bent over posture    SKIN: The skin is warm, dry and intact.      NEUROLOGIC:   Cranial Nerves 2-12 intact  Normal motor strength noted. Muscle bulk and tone are normal.  Sensory exam is normal to all modalities.  Reflexes on the right side demonstrates 2/4 Triceps Reflex, 2/4 Biceps Reflex, 2/4 Brachioradialis Reflex, 2/4 Knee Jerk Reflex, 1/4 Ankle Jerk Reflex and no ankle clonus on the right.   Reflexes on the left side demonstrates 2/4 Triceps Reflex, 2/4 Biceps Reflex, 2/4 Brachioradialis Reflex, 2/4 Knee Jerk Reflex, 1/4 Ankle Jerk Reflex and no ankle clonus on the left.  Superficial/Primitive Reflexes: primitive reflexes were absent.  Finch's, Babinski clonus are negative  No coordination deficit observed.  Radicular testing showed a negative Jay (MARIELLA) test and  negative straight leg raise  laterally t.  Cortical function is intact and " without deficits. Speech is normal.    PSYCHIATRIC: oriented to person, place and time. Patient's mood and affect are normal.      Assessment/Plan   Independent Review of Radiographic Studies:      I personally reviewed the images from the following studies.    MRI of the lumbar spine done at [facility] on April 22, 2020 reveals multilevel spondylolisthesis of 2 mm of L3 on 4 and 2 mm of L4 on 5.  Marrow edema is present at the right L5 pedicle and the left facet joint as well as the right L4 pedicle suggesting stress change.  L5-S1 reveals severe disc desiccation with spondylitic change causing potential contact of both exiting L5 nerve roots.  Due to the spondylolisthesis at L3-4 and L4-5 there is the potential for nerve root encroachment at those 2 levels bilaterally as well.     Plain films of the lumbar spine were done on May 21, 2020 at [facility] and revealed a 2 mm anterolisthesis of L4 on L5 and mild 3 mm of retrolisthesis of T12 on L1 without any change between flexion/extension motion.    Medical Decision Making:      He has progressed to develop intractable neurogenic claudication at fairly short distances up to 100 feet.  We know he has severe lumbar stenosis at L3-4 and L4-5 and flexion-extension films revealed although he has some areas of slight spondylolisthesis he is stable with motion.  Therefore he is now a candidate for an L3-L5 lumbar laminectomy for decompression at L3-4, L4-5 and L5-S1.    A Lumbar Laminectomy involves a small skin incision localized over the affected spinal area which is confirmed by X-ray. Then the thickened bone is removed off the back of the spine with a drill to expose the nerves. Any thickened bone along the disc or joints are removed also to make more room around the nerves.    The patient understands that there are inherent risks to surgery including bleeding, infection, anesthetic risk, death, heart attack, stroke, damage to nerves, weakness, numbness,  paralysis, failure to improve, need for further surgery, CSF leak, recurrence, persistent pain, and any other unforeseen complications. They comprehend and had opportunity to ask further questions.    Return for follow-up after surgery.    Spinal stenosis, lumbar region, with neurogenic claudication  -     Case Request; Standing  -     Case Request    Spondylolisthesis of lumbar region  -     Case Request; Standing  -     Case Request    Other orders  -     Follow anesthesia standing orders.  -     Obtain informed consent  -     Provide NPO Instructions to Patient; Future    Dwight Mcdermott MD FACS [facility]  Neurological Surgery

## 2020-09-16 ENCOUNTER — APPOINTMENT (OUTPATIENT)
Dept: GENERAL RADIOLOGY | Facility: HOSPITAL | Age: 65
End: 2020-09-16

## 2020-09-16 ENCOUNTER — HOSPITAL ENCOUNTER (OUTPATIENT)
Facility: HOSPITAL | Age: 65
Discharge: HOME | End: 2020-09-17
Attending: NEUROLOGICAL SURGERY | Admitting: NEUROLOGICAL SURGERY

## 2020-09-16 ENCOUNTER — ANESTHESIA (OUTPATIENT)
Dept: PERIOP | Facility: HOSPITAL | Age: 65
End: 2020-09-16

## 2020-09-16 ENCOUNTER — ANESTHESIA EVENT (OUTPATIENT)
Dept: PERIOP | Facility: HOSPITAL | Age: 65
End: 2020-09-16

## 2020-09-16 DIAGNOSIS — M71.38 SYNOVIAL CYST OF LUMBAR FACET JOINT: Primary | ICD-10-CM

## 2020-09-16 DIAGNOSIS — M48.062 SPINAL STENOSIS, LUMBAR REGION, WITH NEUROGENIC CLAUDICATION: ICD-10-CM

## 2020-09-16 DIAGNOSIS — M43.16 SPONDYLOLISTHESIS OF LUMBAR REGION: ICD-10-CM

## 2020-09-16 PROCEDURE — 63267 EXCISE INTRSPINL LESION LMBR: CPT | Performed by: NEUROLOGICAL SURGERY

## 2020-09-16 PROCEDURE — 25010000002 PHENYLEPHRINE PER 1 ML: Performed by: NURSE ANESTHETIST, CERTIFIED REGISTERED

## 2020-09-16 PROCEDURE — 63047 LAM FACETEC & FORAMOT LUMBAR: CPT | Performed by: NEUROLOGICAL SURGERY

## 2020-09-16 PROCEDURE — G0378 HOSPITAL OBSERVATION PER HR: HCPCS

## 2020-09-16 PROCEDURE — 72020 X-RAY EXAM OF SPINE 1 VIEW: CPT

## 2020-09-16 PROCEDURE — 25010000002 DEXAMETHASONE PER 1 MG: Performed by: NURSE ANESTHETIST, CERTIFIED REGISTERED

## 2020-09-16 PROCEDURE — 25010000003 CEFAZOLIN IN DEXTROSE 2-4 GM/100ML-% SOLUTION: Performed by: NEUROLOGICAL SURGERY

## 2020-09-16 PROCEDURE — 25010000002 ONDANSETRON PER 1 MG: Performed by: NURSE ANESTHETIST, CERTIFIED REGISTERED

## 2020-09-16 PROCEDURE — 25010000002 KETOROLAC TROMETHAMINE PER 15 MG: Performed by: NURSE ANESTHETIST, CERTIFIED REGISTERED

## 2020-09-16 PROCEDURE — 25010000002 PROPOFOL 10 MG/ML EMULSION: Performed by: NURSE ANESTHETIST, CERTIFIED REGISTERED

## 2020-09-16 PROCEDURE — 88304 TISSUE EXAM BY PATHOLOGIST: CPT | Performed by: NEUROLOGICAL SURGERY

## 2020-09-16 PROCEDURE — 25010000002 HYDROMORPHONE PER 4 MG: Performed by: NURSE ANESTHETIST, CERTIFIED REGISTERED

## 2020-09-16 PROCEDURE — 25010000002 FENTANYL CITRATE (PF) 100 MCG/2ML SOLUTION: Performed by: NURSE ANESTHETIST, CERTIFIED REGISTERED

## 2020-09-16 PROCEDURE — 25010000002 NEOSTIGMINE PER 0.5 MG: Performed by: NURSE ANESTHETIST, CERTIFIED REGISTERED

## 2020-09-16 DEVICE — FLOSEAL HEMOSTATIC MATRIX, 10ML
Type: IMPLANTABLE DEVICE | Site: SPINE LUMBAR | Status: FUNCTIONAL
Brand: FLOSEAL HEMOSTATIC MATRIX

## 2020-09-16 DEVICE — WAX BONE HEMO NAT 2.5G: Type: IMPLANTABLE DEVICE | Site: SPINE LUMBAR | Status: FUNCTIONAL

## 2020-09-16 RX ORDER — DEXAMETHASONE SODIUM PHOSPHATE 10 MG/ML
INJECTION INTRAMUSCULAR; INTRAVENOUS AS NEEDED
Status: DISCONTINUED | OUTPATIENT
Start: 2020-09-16 | End: 2020-09-16 | Stop reason: SURG

## 2020-09-16 RX ORDER — CETIRIZINE HYDROCHLORIDE 10 MG/1
10 TABLET ORAL DAILY
Status: DISCONTINUED | OUTPATIENT
Start: 2020-09-16 | End: 2020-09-17 | Stop reason: HOSPADM

## 2020-09-16 RX ORDER — SODIUM CHLORIDE, SODIUM LACTATE, POTASSIUM CHLORIDE, CALCIUM CHLORIDE 600; 310; 30; 20 MG/100ML; MG/100ML; MG/100ML; MG/100ML
9 INJECTION, SOLUTION INTRAVENOUS CONTINUOUS PRN
Status: DISCONTINUED | OUTPATIENT
Start: 2020-09-16 | End: 2020-09-17 | Stop reason: HOSPADM

## 2020-09-16 RX ORDER — FLUMAZENIL 0.1 MG/ML
0.2 INJECTION INTRAVENOUS AS NEEDED
Status: DISCONTINUED | OUTPATIENT
Start: 2020-09-16 | End: 2020-09-16 | Stop reason: HOSPADM

## 2020-09-16 RX ORDER — ONDANSETRON 2 MG/ML
4 INJECTION INTRAMUSCULAR; INTRAVENOUS ONCE AS NEEDED
Status: DISCONTINUED | OUTPATIENT
Start: 2020-09-16 | End: 2020-09-16 | Stop reason: HOSPADM

## 2020-09-16 RX ORDER — PROMETHAZINE HYDROCHLORIDE 25 MG/1
25 SUPPOSITORY RECTAL ONCE AS NEEDED
Status: DISCONTINUED | OUTPATIENT
Start: 2020-09-16 | End: 2020-09-16 | Stop reason: HOSPADM

## 2020-09-16 RX ORDER — HYDROCODONE BITARTRATE AND ACETAMINOPHEN 7.5; 325 MG/1; MG/1
1 TABLET ORAL EVERY 4 HOURS PRN
Status: DISCONTINUED | OUTPATIENT
Start: 2020-09-16 | End: 2020-09-17 | Stop reason: HOSPADM

## 2020-09-16 RX ORDER — DIPHENHYDRAMINE HYDROCHLORIDE 50 MG/ML
12.5 INJECTION INTRAMUSCULAR; INTRAVENOUS
Status: DISCONTINUED | OUTPATIENT
Start: 2020-09-16 | End: 2020-09-16 | Stop reason: HOSPADM

## 2020-09-16 RX ORDER — HYDROCODONE BITARTRATE AND ACETAMINOPHEN 7.5; 325 MG/1; MG/1
1 TABLET ORAL ONCE AS NEEDED
Status: DISCONTINUED | OUTPATIENT
Start: 2020-09-16 | End: 2020-09-16 | Stop reason: HOSPADM

## 2020-09-16 RX ORDER — GLYCOPYRROLATE 0.2 MG/ML
INJECTION INTRAMUSCULAR; INTRAVENOUS AS NEEDED
Status: DISCONTINUED | OUTPATIENT
Start: 2020-09-16 | End: 2020-09-16 | Stop reason: SURG

## 2020-09-16 RX ORDER — LEVOTHYROXINE SODIUM 0.1 MG/1
100 TABLET ORAL
Status: DISCONTINUED | OUTPATIENT
Start: 2020-09-17 | End: 2020-09-17 | Stop reason: HOSPADM

## 2020-09-16 RX ORDER — MELOXICAM 15 MG/1
15 TABLET ORAL DAILY
Status: DISCONTINUED | OUTPATIENT
Start: 2020-09-16 | End: 2020-09-17 | Stop reason: HOSPADM

## 2020-09-16 RX ORDER — PROMETHAZINE HYDROCHLORIDE 25 MG/1
25 TABLET ORAL ONCE AS NEEDED
Status: DISCONTINUED | OUTPATIENT
Start: 2020-09-16 | End: 2020-09-16 | Stop reason: HOSPADM

## 2020-09-16 RX ORDER — MAGNESIUM HYDROXIDE 1200 MG/15ML
LIQUID ORAL AS NEEDED
Status: DISCONTINUED | OUTPATIENT
Start: 2020-09-16 | End: 2020-09-16 | Stop reason: HOSPADM

## 2020-09-16 RX ORDER — DIPHENHYDRAMINE HCL 25 MG
25 CAPSULE ORAL
Status: DISCONTINUED | OUTPATIENT
Start: 2020-09-16 | End: 2020-09-16 | Stop reason: HOSPADM

## 2020-09-16 RX ORDER — KETOROLAC TROMETHAMINE 30 MG/ML
INJECTION, SOLUTION INTRAMUSCULAR; INTRAVENOUS AS NEEDED
Status: DISCONTINUED | OUTPATIENT
Start: 2020-09-16 | End: 2020-09-16 | Stop reason: SURG

## 2020-09-16 RX ORDER — LABETALOL HYDROCHLORIDE 5 MG/ML
5 INJECTION, SOLUTION INTRAVENOUS
Status: DISCONTINUED | OUTPATIENT
Start: 2020-09-16 | End: 2020-09-16 | Stop reason: HOSPADM

## 2020-09-16 RX ORDER — ATORVASTATIN CALCIUM 20 MG/1
10 TABLET, FILM COATED ORAL NIGHTLY
Status: DISCONTINUED | OUTPATIENT
Start: 2020-09-16 | End: 2020-09-17 | Stop reason: HOSPADM

## 2020-09-16 RX ORDER — ZOLPIDEM TARTRATE 5 MG/1
5 TABLET ORAL NIGHTLY PRN
Status: DISCONTINUED | OUTPATIENT
Start: 2020-09-16 | End: 2020-09-17 | Stop reason: HOSPADM

## 2020-09-16 RX ORDER — GABAPENTIN 300 MG/1
300 CAPSULE ORAL 3 TIMES DAILY
Status: DISCONTINUED | OUTPATIENT
Start: 2020-09-16 | End: 2020-09-17 | Stop reason: HOSPADM

## 2020-09-16 RX ORDER — CYCLOBENZAPRINE HCL 10 MG
10 TABLET ORAL 3 TIMES DAILY PRN
Status: DISCONTINUED | OUTPATIENT
Start: 2020-09-16 | End: 2020-09-17 | Stop reason: HOSPADM

## 2020-09-16 RX ORDER — SODIUM CHLORIDE 0.9 % (FLUSH) 0.9 %
10 SYRINGE (ML) INJECTION AS NEEDED
Status: DISCONTINUED | OUTPATIENT
Start: 2020-09-16 | End: 2020-09-16 | Stop reason: HOSPADM

## 2020-09-16 RX ORDER — OXYCODONE AND ACETAMINOPHEN 7.5; 325 MG/1; MG/1
1 TABLET ORAL ONCE AS NEEDED
Status: DISCONTINUED | OUTPATIENT
Start: 2020-09-16 | End: 2020-09-16 | Stop reason: HOSPADM

## 2020-09-16 RX ORDER — EPHEDRINE SULFATE 50 MG/ML
INJECTION, SOLUTION INTRAVENOUS AS NEEDED
Status: DISCONTINUED | OUTPATIENT
Start: 2020-09-16 | End: 2020-09-16 | Stop reason: SURG

## 2020-09-16 RX ORDER — NALOXONE HCL 0.4 MG/ML
0.4 VIAL (ML) INJECTION
Status: DISCONTINUED | OUTPATIENT
Start: 2020-09-16 | End: 2020-09-17 | Stop reason: HOSPADM

## 2020-09-16 RX ORDER — ROCURONIUM BROMIDE 10 MG/ML
INJECTION, SOLUTION INTRAVENOUS AS NEEDED
Status: DISCONTINUED | OUTPATIENT
Start: 2020-09-16 | End: 2020-09-16 | Stop reason: SURG

## 2020-09-16 RX ORDER — BUPIVACAINE HYDROCHLORIDE AND EPINEPHRINE 5; 5 MG/ML; UG/ML
INJECTION, SOLUTION PERINEURAL AS NEEDED
Status: DISCONTINUED | OUTPATIENT
Start: 2020-09-16 | End: 2020-09-16 | Stop reason: HOSPADM

## 2020-09-16 RX ORDER — SODIUM CHLORIDE 0.9 % (FLUSH) 0.9 %
10 SYRINGE (ML) INJECTION EVERY 12 HOURS SCHEDULED
Status: DISCONTINUED | OUTPATIENT
Start: 2020-09-16 | End: 2020-09-16 | Stop reason: HOSPADM

## 2020-09-16 RX ORDER — ONDANSETRON 4 MG/1
4 TABLET, FILM COATED ORAL EVERY 6 HOURS PRN
Status: DISCONTINUED | OUTPATIENT
Start: 2020-09-16 | End: 2020-09-17 | Stop reason: HOSPADM

## 2020-09-16 RX ORDER — ACETAMINOPHEN 325 MG/1
650 TABLET ORAL EVERY 4 HOURS PRN
Status: DISCONTINUED | OUTPATIENT
Start: 2020-09-16 | End: 2020-09-17 | Stop reason: HOSPADM

## 2020-09-16 RX ORDER — MORPHINE SULFATE 2 MG/ML
2 INJECTION, SOLUTION INTRAMUSCULAR; INTRAVENOUS EVERY 4 HOURS PRN
Status: DISCONTINUED | OUTPATIENT
Start: 2020-09-16 | End: 2020-09-17 | Stop reason: HOSPADM

## 2020-09-16 RX ORDER — SUMATRIPTAN 50 MG/1
50 TABLET, FILM COATED ORAL ONCE
Status: DISCONTINUED | OUTPATIENT
Start: 2020-09-16 | End: 2020-09-17 | Stop reason: HOSPADM

## 2020-09-16 RX ORDER — LIDOCAINE HYDROCHLORIDE 20 MG/ML
INJECTION, SOLUTION INFILTRATION; PERINEURAL AS NEEDED
Status: DISCONTINUED | OUTPATIENT
Start: 2020-09-16 | End: 2020-09-16 | Stop reason: SURG

## 2020-09-16 RX ORDER — HYDROCODONE BITARTRATE AND ACETAMINOPHEN 7.5; 325 MG/1; MG/1
2 TABLET ORAL EVERY 4 HOURS PRN
Status: DISCONTINUED | OUTPATIENT
Start: 2020-09-16 | End: 2020-09-17 | Stop reason: HOSPADM

## 2020-09-16 RX ORDER — NALOXONE HCL 0.4 MG/ML
0.2 VIAL (ML) INJECTION AS NEEDED
Status: DISCONTINUED | OUTPATIENT
Start: 2020-09-16 | End: 2020-09-16 | Stop reason: HOSPADM

## 2020-09-16 RX ORDER — PROPOFOL 10 MG/ML
VIAL (ML) INTRAVENOUS AS NEEDED
Status: DISCONTINUED | OUTPATIENT
Start: 2020-09-16 | End: 2020-09-16 | Stop reason: SURG

## 2020-09-16 RX ORDER — EPHEDRINE SULFATE 50 MG/ML
5 INJECTION, SOLUTION INTRAVENOUS ONCE AS NEEDED
Status: DISCONTINUED | OUTPATIENT
Start: 2020-09-16 | End: 2020-09-16 | Stop reason: HOSPADM

## 2020-09-16 RX ORDER — SODIUM CHLORIDE 0.9 % (FLUSH) 0.9 %
10 SYRINGE (ML) INJECTION AS NEEDED
Status: DISCONTINUED | OUTPATIENT
Start: 2020-09-16 | End: 2020-09-17 | Stop reason: HOSPADM

## 2020-09-16 RX ORDER — MIDAZOLAM HYDROCHLORIDE 1 MG/ML
1 INJECTION INTRAMUSCULAR; INTRAVENOUS
Status: DISCONTINUED | OUTPATIENT
Start: 2020-09-16 | End: 2020-09-16 | Stop reason: HOSPADM

## 2020-09-16 RX ORDER — FENTANYL CITRATE 50 UG/ML
INJECTION, SOLUTION INTRAMUSCULAR; INTRAVENOUS AS NEEDED
Status: DISCONTINUED | OUTPATIENT
Start: 2020-09-16 | End: 2020-09-16 | Stop reason: SURG

## 2020-09-16 RX ORDER — FENTANYL CITRATE 50 UG/ML
50 INJECTION, SOLUTION INTRAMUSCULAR; INTRAVENOUS
Status: DISCONTINUED | OUTPATIENT
Start: 2020-09-16 | End: 2020-09-16 | Stop reason: HOSPADM

## 2020-09-16 RX ORDER — SODIUM CHLORIDE AND POTASSIUM CHLORIDE 150; 900 MG/100ML; MG/100ML
75 INJECTION, SOLUTION INTRAVENOUS CONTINUOUS
Status: DISCONTINUED | OUTPATIENT
Start: 2020-09-16 | End: 2020-09-17 | Stop reason: HOSPADM

## 2020-09-16 RX ORDER — HYDROMORPHONE HCL 110MG/55ML
PATIENT CONTROLLED ANALGESIA SYRINGE INTRAVENOUS AS NEEDED
Status: DISCONTINUED | OUTPATIENT
Start: 2020-09-16 | End: 2020-09-16 | Stop reason: SURG

## 2020-09-16 RX ORDER — ONDANSETRON 2 MG/ML
4 INJECTION INTRAMUSCULAR; INTRAVENOUS EVERY 6 HOURS PRN
Status: DISCONTINUED | OUTPATIENT
Start: 2020-09-16 | End: 2020-09-17 | Stop reason: HOSPADM

## 2020-09-16 RX ORDER — HYDROMORPHONE HYDROCHLORIDE 1 MG/ML
0.5 INJECTION, SOLUTION INTRAMUSCULAR; INTRAVENOUS; SUBCUTANEOUS
Status: DISCONTINUED | OUTPATIENT
Start: 2020-09-16 | End: 2020-09-16 | Stop reason: HOSPADM

## 2020-09-16 RX ORDER — KETOROLAC TROMETHAMINE 15 MG/ML
15 INJECTION, SOLUTION INTRAMUSCULAR; INTRAVENOUS EVERY 6 HOURS PRN
Status: DISCONTINUED | OUTPATIENT
Start: 2020-09-16 | End: 2020-09-17 | Stop reason: HOSPADM

## 2020-09-16 RX ORDER — SODIUM CHLORIDE 0.9 % (FLUSH) 0.9 %
3 SYRINGE (ML) INJECTION EVERY 12 HOURS SCHEDULED
Status: DISCONTINUED | OUTPATIENT
Start: 2020-09-16 | End: 2020-09-17 | Stop reason: HOSPADM

## 2020-09-16 RX ORDER — ONDANSETRON 2 MG/ML
INJECTION INTRAMUSCULAR; INTRAVENOUS AS NEEDED
Status: DISCONTINUED | OUTPATIENT
Start: 2020-09-16 | End: 2020-09-16 | Stop reason: SURG

## 2020-09-16 RX ORDER — CEFAZOLIN SODIUM 2 G/100ML
2 INJECTION, SOLUTION INTRAVENOUS ONCE
Status: COMPLETED | OUTPATIENT
Start: 2020-09-16 | End: 2020-09-16

## 2020-09-16 RX ORDER — LORATADINE 10 MG/1
10 CAPSULE, LIQUID FILLED ORAL DAILY
COMMUNITY

## 2020-09-16 RX ORDER — TAMSULOSIN HYDROCHLORIDE 0.4 MG/1
0.4 CAPSULE ORAL NIGHTLY
Status: DISCONTINUED | OUTPATIENT
Start: 2020-09-16 | End: 2020-09-17 | Stop reason: HOSPADM

## 2020-09-16 RX ORDER — PANTOPRAZOLE SODIUM 40 MG/1
40 TABLET, DELAYED RELEASE ORAL EVERY MORNING
Status: DISCONTINUED | OUTPATIENT
Start: 2020-09-16 | End: 2020-09-17 | Stop reason: HOSPADM

## 2020-09-16 RX ADMIN — HYDROMORPHONE HYDROCHLORIDE 0.5 MG: 2 INJECTION, SOLUTION INTRAMUSCULAR; INTRAVENOUS; SUBCUTANEOUS at 09:22

## 2020-09-16 RX ADMIN — SODIUM CHLORIDE, PRESERVATIVE FREE 3 ML: 5 INJECTION INTRAVENOUS at 18:41

## 2020-09-16 RX ADMIN — EPHEDRINE SULFATE 10 MG: 50 INJECTION INTRAVENOUS at 10:02

## 2020-09-16 RX ADMIN — FENTANYL CITRATE 50 MCG: 50 INJECTION, SOLUTION INTRAMUSCULAR; INTRAVENOUS at 11:22

## 2020-09-16 RX ADMIN — KETOROLAC TROMETHAMINE 30 MG: 30 INJECTION, SOLUTION INTRAMUSCULAR; INTRAVENOUS at 09:45

## 2020-09-16 RX ADMIN — GLYCOPYRROLATE 0.4 MG: 0.2 INJECTION INTRAMUSCULAR; INTRAVENOUS at 09:37

## 2020-09-16 RX ADMIN — SODIUM CHLORIDE, POTASSIUM CHLORIDE, SODIUM LACTATE AND CALCIUM CHLORIDE 9 ML/HR: 600; 310; 30; 20 INJECTION, SOLUTION INTRAVENOUS at 06:27

## 2020-09-16 RX ADMIN — SODIUM CHLORIDE, PRESERVATIVE FREE 3 ML: 5 INJECTION INTRAVENOUS at 21:03

## 2020-09-16 RX ADMIN — MELOXICAM 15 MG: 15 TABLET ORAL at 18:40

## 2020-09-16 RX ADMIN — PHENYLEPHRINE HYDROCHLORIDE 100 MCG: 10 INJECTION INTRAVENOUS at 07:48

## 2020-09-16 RX ADMIN — HYDROCODONE BITARTRATE AND ACETAMINOPHEN 2 TABLET: 7.5; 325 TABLET ORAL at 21:04

## 2020-09-16 RX ADMIN — HYDROCODONE BITARTRATE AND ACETAMINOPHEN 2 TABLET: 7.5; 325 TABLET ORAL at 16:48

## 2020-09-16 RX ADMIN — HYDROCODONE BITARTRATE AND ACETAMINOPHEN 2 TABLET: 7.5; 325 TABLET ORAL at 13:15

## 2020-09-16 RX ADMIN — ONDANSETRON HYDROCHLORIDE 4 MG: 2 SOLUTION INTRAMUSCULAR; INTRAVENOUS at 09:34

## 2020-09-16 RX ADMIN — NEOSTIGMINE METHYLSULFATE 3 MG: 1 INJECTION INTRAMUSCULAR; INTRAVENOUS; SUBCUTANEOUS at 09:37

## 2020-09-16 RX ADMIN — ATORVASTATIN CALCIUM 10 MG: 20 TABLET, FILM COATED ORAL at 21:02

## 2020-09-16 RX ADMIN — HYDROMORPHONE HYDROCHLORIDE 0.5 MG: 2 INJECTION, SOLUTION INTRAMUSCULAR; INTRAVENOUS; SUBCUTANEOUS at 09:40

## 2020-09-16 RX ADMIN — LIDOCAINE HYDROCHLORIDE 100 MG: 20 INJECTION, SOLUTION INFILTRATION; PERINEURAL at 07:31

## 2020-09-16 RX ADMIN — FENTANYL CITRATE 100 MCG: 50 INJECTION INTRAMUSCULAR; INTRAVENOUS at 07:29

## 2020-09-16 RX ADMIN — PROPOFOL 200 MG: 10 INJECTION, EMULSION INTRAVENOUS at 07:31

## 2020-09-16 RX ADMIN — GABAPENTIN 300 MG: 300 CAPSULE ORAL at 21:03

## 2020-09-16 RX ADMIN — TAMSULOSIN HYDROCHLORIDE 0.4 MG: 0.4 CAPSULE ORAL at 21:03

## 2020-09-16 RX ADMIN — GLYCOPYRROLATE 0.1 MG: 0.2 INJECTION INTRAMUSCULAR; INTRAVENOUS at 07:48

## 2020-09-16 RX ADMIN — PROPOFOL 25 MCG/KG/MIN: 10 INJECTION, EMULSION INTRAVENOUS at 07:44

## 2020-09-16 RX ADMIN — ROCURONIUM BROMIDE 50 MG: 10 INJECTION INTRAVENOUS at 07:32

## 2020-09-16 RX ADMIN — GABAPENTIN 300 MG: 300 CAPSULE ORAL at 16:32

## 2020-09-16 RX ADMIN — FENTANYL CITRATE 50 MCG: 50 INJECTION, SOLUTION INTRAMUSCULAR; INTRAVENOUS at 11:00

## 2020-09-16 RX ADMIN — DEXAMETHASONE SODIUM PHOSPHATE 8 MG: 10 INJECTION INTRAMUSCULAR; INTRAVENOUS at 09:07

## 2020-09-16 RX ADMIN — CEFAZOLIN SODIUM 2 G: 2 INJECTION, SOLUTION INTRAVENOUS at 07:38

## 2020-09-16 NOTE — SIGNIFICANT NOTE
Spoke with wife Silvina on the phone and informed her pt was doing well in recovery.  He is now ready to go up to his room Z774

## 2020-09-16 NOTE — PERIOPERATIVE NURSING NOTE
Dr. Tanner aware of pt's report of cervical pinched nerve 10 years ago pain down right arm, has been fine since 4 epidurals that he had at that time. Reports is careful with neck and can not have neck hyperextended for long period of time in surgery. Dr. Tanner aware, pt ensured will be in neutral position after ETT placement. Will notify Dr. Mcdermott of pt's concern

## 2020-09-16 NOTE — DISCHARGE INSTRUCTIONS
[facility] Neurological Surgery  [address] [facility], [address]  [place] [address]  Phone:  [phone]  Fax:  [phone]    Dr. Dwight Mcdermott MD FACS FAANS            Low Back Surgery Post-Operative Instructions  (Lumbar Discectomy or Lumbar Decompression)        1. Change your bandage in about 24 hours following surgery and you may replace with another guaze bandage or leave it off.  Check the cut (incision) made by the surgeon twice a day for signs of infection.  Some signs of infection may include:  a. A foul smelling, greenish or yellowish discharge form the wound.  b. Increased pain  c. Increased redness over the incision (operative) site  d. Skin edges separate  e. Flu-like symptoms (problems)  f. A temperature above 101.5° F (38.6° C) .    2. You may resume normal diet as you tolerate    3. No lifting overhead, although you may place your arms above your head.  DO NOT lift anything over five (5) pounds.  Walking is allowed and encouraged. There are no restrictions on sitting or climbing stairs, but refrain from overly strenuous activity.  You may notice that a constant position (standing or sitting) greater than 45 minutes may tend to make you more sore and therefore it is recommended that you change positions frequently. Do not drive until you are seen back for your first postoperative visit, although you may be a passenger in a car.      4. Walking is permitted.  You may use a treadmill without an incline.  Back off on activity if you have discomfort.  You may also use stairs as much as you can tolerate, just take it slow and easy.    5. Refrain from any sexual activity until after your first evaluation at the office.     6. Back pain after surgery may worsen 2 to 3 days following surgery.  It should smooth out after the third day.  Continue the pain medication and muscle relaxers as prescribed.  You may also need to take a stool softener like Senokot-S if you develop constipation.    7. You may shower  and pat the incision dry, but do not soak your wound in water such as a swimming pool, hot tub or lake.   Avoid direct sunlight to the wound for at least three (3) months.  You may apply ice to the surgical site for 15 to 20 minutes each hour while awake for the first few days following surgery.  Simply put the ice in a plastic bag in place a towel between the bag of ice and your skin.    8. You have Steri-Strips applied to the skin edges of your incision.  They should fall off in 7 to 10 days, however, if they do not fall off by the 10th day you may remove them.    9. You will leave the hospital with prescriptions for pain medicine and a muscle relaxer.  These medications must be taken as prescribed only.  If a refill of your pain medication is required, in order to have this medication refilled, you must contact the office 3 days (72 hrs) prior to running out, but the amount prescribed is generally enough to last until the first post-operative visit.      10. A small amount of bleeding from the incision during the first few days is not unusual.      11.  You should have a post-operative visit approximately 2 weeks after surgery.  If you do not have a scheduled appointment, call the office at [phone] to schedule one.  We will discuss return to work at your first post-operative visit, but you can expect to be off of work for an average of 6 weeks.     12. Notify the office if there are any problems, such as:    a. Excessive back or leg pain   b. If you lose control of urine or bowel movements  c. Persistent temperature of 101.5° F (38.6° C) or greater that is not relieved by Tylenol.  d. Excessive bleeding, redness, heat, leakage of fluid, swelling or pus around the incision   e. If you develop difficulty breathing, have chest pain or shortness of breath, call [phone].  f. If you develop swelling in the calf or leg  g. Your pain is not controlled with medication  h. You seem to be getting worse rather than  better    Thank you.

## 2020-09-16 NOTE — ANESTHESIA PREPROCEDURE EVALUATION
Anesthesia Evaluation     Patient summary reviewed   history of anesthetic complications: PONV  NPO Solid Status: > 8 hours             Airway   Mallampati: II  No difficulty expected  Dental      Pulmonary    (+) a smoker Current Smoked day of surgery,   Cardiovascular     ECG reviewed  Rhythm: regular        Neuro/Psych  GI/Hepatic/Renal/Endo      Musculoskeletal     Abdominal    Substance History      OB/GYN          Other                        Anesthesia Plan    ASA 2     general       Anesthetic plan, all risks, benefits, and alternatives have been provided, discussed and informed consent has been obtained with: patient.  Use of blood products discussed with patient .

## 2020-09-16 NOTE — ANESTHESIA POSTPROCEDURE EVALUATION
"Patient: Manuelito De Santiago    Procedure Summary     Date: 09/16/20 Room / Location: [facility] OR [address] [place] [address] MAIN OR    Anesthesia Start: 0721 Anesthesia Stop: 1012    Procedure: Lumbar laminectomy lumbar 3 - lumbar 4, lumbar 4 - lumbar 5 for bilateral decompression (Bilateral Spine Lumbar) Diagnosis:       Spinal stenosis, lumbar region, with neurogenic claudication      Spondylolisthesis of lumbar region      (Spinal stenosis, lumbar region, with neurogenic claudication [M48.062])      (Spondylolisthesis of lumbar region [M43.16])    Surgeon: Dwight Mcdermott MD Provider: Alejandro Tanner MD    Anesthesia Type: general ASA Status: 2          Anesthesia Type: general    Vitals  Vitals Value Taken Time   /55 09/16/20 1200   Temp 36.6 °C (97.8 °F) 09/16/20 1009   Pulse 56 09/16/20 1216   Resp 12 09/16/20 1200   SpO2 100 % 09/16/20 1216   Vitals shown include unvalidated device data.        Post Anesthesia Care and Evaluation    Patient location during evaluation: bedside  Patient participation: complete - patient participated  Level of consciousness: awake  Pain score: 2  Pain management: adequate  Airway patency: patent  Anesthetic complications: No anesthetic complications  PONV Status: none  Cardiovascular status: acceptable  Respiratory status: acceptable  Hydration status: acceptable    Comments: /55   Pulse 59   Temp 36.6 °C (97.8 °F) (Oral)   Resp 12   Ht 177.8 cm (70\")   Wt 71.4 kg (157 lb 6.5 oz)   SpO2 99%   BMI 22.59 kg/m²         "

## 2020-09-16 NOTE — ANESTHESIA PROCEDURE NOTES
Airway  Urgency: elective    Date/Time: 9/16/2020 7:36 AM  Difficult airway (C-mac used, trachea appears to be left lateral to the esophagus. )    General Information and Staff    Patient location during procedure: OR  Anesthesiologist: Alejandro Tanner MD  CRNA: Michelle Tucker CRNA    Indications and Patient Condition  Indications for airway management: airway protection    Preoxygenated: yes  Mask difficulty assessment: 2 - vent by mask + OA or adjuvant +/- NMBA    Final Airway Details  Final airway type: endotracheal airway      Successful airway: ETT  Cuffed: yes   Successful intubation technique: video laryngoscopy  Facilitating devices/methods: intubating stylet  Blade: CMAC  Blade size: D  ETT size (mm): 7.5  Cormack-Lehane Classification: grade I - full view of glottis  Placement verified by: chest auscultation and capnometry   Cuff volume (mL): 5  Measured from: lips  ETT/EBT  to lips (cm): 26  Number of attempts at approach: 2  Assessment: lips, teeth, and gum same as pre-op and atraumatic intubation    Additional Comments  C-mac used, trachea appears to be left lateral to esophagus

## 2020-09-16 NOTE — OP NOTE
Lumbar Decompressive Laminectomy Procedure Note    Manuelito De Santiago  9/16/2020  [id number]    SURGEON  Dwight Mcdermott MD    Assistant:    Liliana Alvarez CSA was present throughout the entire surgery to provide intraoperative suction, retraction, suturing, and irrigation to promote visualization by the operative surgeon and assist with opening and closure.  The skilled assistance was necessary for the success of this case.  Our practice does not have a relationship with neurosurgical residents.    Indication:  Severe neurogenic claudication intractable to conservative management over time    Pre-op Diagnosis:   Spinal stenosis, lumbar region, with neurogenic claudication [M48.062]  Spondylolisthesis of lumbar region [M43.16]    Post-op Diagnosis:     Spinal stenosis, lumbar region, with neurogenic claudication [M48.062]  Spondylolisthesis of lumbar region [M43.16]  Synovial cyst right L3-4 facet joint    Procedure Performed:  Procedure(s):  Lumbar laminectomy lumbar 3 - lumbar 4, lumbar 4 - lumbar 5 for bilateral decompression    1.  Lumbar Laminectomy, Medial Facetectomy, and Foraminotomy bilaterally at L3 thru L5 with decompression of the cauda equina and nerve roots bilaterally at L3-4, L4-5, and L5-S1.  Careful resection of the synovial cyst of the right medial facet joint at L3-4 which was firmly adherent to the dura.    Anesthesia: General    Staff:   Circulator: Ree Kingston RN  Radiology Technologist: Maricruz Marks  Scrub Person: Martín Tuttle  Assistant: Liliana Alvarez CSA; Velia Ribeiro APRN  Orientee: Anyi Schroeder RN    Estimated Blood Loss: 100 mL    Specimens:    Order Name Source Comment Collection Info Order Time   TISSUE PATHOLOGY EXAM Spine, Lumbar  Collected By: Dwight Mcdermott MD 9/16/2020  9:14 AM        Drains: None    Findings: Severe lateral recess stenosis due to medial facet hypertrophy with a synovial cyst on the right at L3-4 causing additional  compression of the right L4 nerve root.    Complications: None immediate    Narrative Description:     Positioning:  After proper informed consent, the patient was taken to the OR in stable condition and placed under general anesthesia. Once anesthetized the patient was turned prone on a Jordy frame and was prepped and draped in the usual sterile fashion. A needle was used to localize the lumbar spine and then an incision was infiltrated with local anesthesia.     Approach:  The skin incision was taken down to the superficial fascia which was then incised with Bovie electrocautery. A subperiosteal dissection was performed down to level facet joints bilaterally.  Loupe magnification was used to visualize the deep spinal anatomy.    Lumbar decompression: The spinous process of L3, L4 and L5 which were then removed with a [employer] bone cutter. Next, the undersurface of L5 laminotomy was performed and then the pneumatic drill was used to perform the laminectomy across L3, L4 and L5 without incident. It was apparent after dissection of the lateral recesses at these levels bilaterally that there was severe lateral recess stenosis due to medial facet hypertrophy and the facets were firmly adherent to the dura.  There was also on the right very severe fibrosis of the medial facet and it was apparent that there was a large synovial cyst compressing the right thecal sac and L4 nerve root at L3-4 to the right. The synovial cyst was mobilized from inflammatory tissue and resected.  After decompression of the lateral recesses it appeared the dura had been adequately decompressed in addition to bilateral nerve roots at all 3 levels. The Macdonald ball probe was used to assess the foramen along the L 3, L4, L5 and S1 nerve roots bilaterally and all nerve roots decompressed.    Closure:  The wound was irrigated and hemostasis was achieved with Flowseal and bipolar electrocautery of the epidural plexus. No CSF leak was seen. The deep  muscle was also closed with 0 interrupted Vicryl sutures, the superficial fascia closed with 0 Vicryl suture in a running fashion, the superficial subcutaneous tissue closed with 2-0 Vicryl suture interrupted, and the superficial skin was closed with a 3-0 Vicryl suture in a running subcuticular fashion. No intraoperative complications are recorded and patient was transferred to postop recovery in stable condition.    Dwight Mcdermott MD     Date: 9/16/2020  Time: 09:53 EDT

## 2020-09-17 VITALS
BODY MASS INDEX: 22.54 KG/M2 | DIASTOLIC BLOOD PRESSURE: 59 MMHG | WEIGHT: 157.41 LBS | TEMPERATURE: 98 F | HEART RATE: 62 BPM | SYSTOLIC BLOOD PRESSURE: 106 MMHG | RESPIRATION RATE: 16 BRPM | HEIGHT: 70 IN | OXYGEN SATURATION: 100 %

## 2020-09-17 LAB
CYTO UR: NORMAL
LAB AP CASE REPORT: NORMAL
PATH REPORT.FINAL DX SPEC: NORMAL
PATH REPORT.GROSS SPEC: NORMAL

## 2020-09-17 PROCEDURE — G0378 HOSPITAL OBSERVATION PER HR: HCPCS

## 2020-09-17 RX ORDER — HYDROCODONE BITARTRATE AND ACETAMINOPHEN 7.5; 325 MG/1; MG/1
1 TABLET ORAL EVERY 6 HOURS PRN
Qty: 40 TABLET | Refills: 0 | Status: SHIPPED | OUTPATIENT
Start: 2020-09-17 | End: 2020-09-29

## 2020-09-17 RX ORDER — BACLOFEN 10 MG/1
10 TABLET ORAL EVERY 8 HOURS PRN
Qty: 40 TABLET | Refills: 0 | Status: SHIPPED | OUTPATIENT
Start: 2020-09-17 | End: 2020-09-29

## 2020-09-17 RX ADMIN — LEVOTHYROXINE SODIUM 100 MCG: 100 TABLET ORAL at 05:30

## 2020-09-17 RX ADMIN — HYDROCODONE BITARTRATE AND ACETAMINOPHEN 1 TABLET: 7.5; 325 TABLET ORAL at 14:53

## 2020-09-17 RX ADMIN — MELOXICAM 15 MG: 15 TABLET ORAL at 08:00

## 2020-09-17 RX ADMIN — HYDROCODONE BITARTRATE AND ACETAMINOPHEN 2 TABLET: 7.5; 325 TABLET ORAL at 05:31

## 2020-09-17 RX ADMIN — PANTOPRAZOLE SODIUM 40 MG: 40 TABLET, DELAYED RELEASE ORAL at 05:30

## 2020-09-17 RX ADMIN — GABAPENTIN 300 MG: 300 CAPSULE ORAL at 08:00

## 2020-09-17 RX ADMIN — CETIRIZINE HYDROCHLORIDE 10 MG: 10 TABLET ORAL at 08:00

## 2020-09-17 RX ADMIN — HYDROCODONE BITARTRATE AND ACETAMINOPHEN 1 TABLET: 7.5; 325 TABLET ORAL at 09:49

## 2020-09-17 RX ADMIN — SODIUM CHLORIDE, PRESERVATIVE FREE 3 ML: 5 INJECTION INTRAVENOUS at 08:04

## 2020-09-17 RX ADMIN — HYDROCODONE BITARTRATE AND ACETAMINOPHEN 2 TABLET: 7.5; 325 TABLET ORAL at 01:01

## 2020-09-17 NOTE — PLAN OF CARE
Problem: Adult Inpatient Plan of Care  Goal: Plan of Care Review  Flowsheets (Taken 9/17/2020 0420)  Progress: improving  Plan of Care Reviewed With: patient  Outcome Summary:   VSS   pt is alert and oriented   up with stand by assist   dressing to back with scant dressing   PRN pain meds given per pt request   will continue to monitor     Problem: Adult Inpatient Plan of Care  Goal: Absence of Hospital-Acquired Illness or Injury  Intervention: Identify and Manage Fall Risk  Recent Flowsheet Documentation  Taken 9/17/2020 0412 by Liliana Baxter RN  Safety Promotion/Fall Prevention:   safety round/check completed   room organization consistent   nonskid shoes/slippers when out of bed  Taken 9/17/2020 0207 by Liliana Baxter RN  Safety Promotion/Fall Prevention:   nonskid shoes/slippers when out of bed   safety round/check completed   room organization consistent  Taken 9/17/2020 0101 by Liliana Baxter RN  Safety Promotion/Fall Prevention:   safety round/check completed   room organization consistent   nonskid shoes/slippers when out of bed  Taken 9/17/2020 0015 by Liliana Baxter RN  Safety Promotion/Fall Prevention:   safety round/check completed   room organization consistent   nonskid shoes/slippers when out of bed  Taken 9/16/2020 2227 by Liliana Baxter RN  Safety Promotion/Fall Prevention:   safety round/check completed   room organization consistent   nonskid shoes/slippers when out of bed  Taken 9/16/2020 2102 by Liliana Baxter RN  Safety Promotion/Fall Prevention:   safety round/check completed   room organization consistent   nonskid shoes/slippers when out of bed  Taken 9/16/2020 1944 by Liliana Baxter RN  Safety Promotion/Fall Prevention:   activity supervised   room organization consistent   safety round/check completed   nonskid shoes/slippers when out of bed  Intervention: Prevent Skin Injury  Recent Flowsheet Documentation  Taken 9/17/2020 0412 by Vee  Liliana BECKETT RN  Body Position: position changed independently  Taken 9/17/2020 0207 by Liliana Baxter RN  Body Position: position changed independently  Taken 9/17/2020 0015 by Liliana Baxter RN  Body Position: position changed independently  Taken 9/16/2020 2227 by Lilaina Baxter RN  Body Position: position changed independently  Taken 9/16/2020 2102 by Liliana Baxter RN  Body Position: position changed independently  Taken 9/16/2020 1944 by Liliana Baxter RN  Body Position:   position changed independently   supine  Intervention: Prevent and Manage VTE (venous thromboembolism) Risk  Recent Flowsheet Documentation  Taken 9/17/2020 0412 by Liliana Baxter RN  VTE Prevention/Management: sequential compression devices off  Taken 9/17/2020 0207 by Liliana Baxter RN  VTE Prevention/Management:   bilateral   sequential compression devices on  Taken 9/16/2020 2102 by Liliana Baxter RN  VTE Prevention/Management:   bilateral   sequential compression devices off  Intervention: Prevent Infection  Recent Flowsheet Documentation  Taken 9/17/2020 0412 by Liliana Baxter RN  Infection Prevention: rest/sleep promoted  Taken 9/17/2020 0207 by Liliana Baxter RN  Infection Prevention: rest/sleep promoted  Taken 9/17/2020 0101 by Liliana Baxter RN  Infection Prevention: rest/sleep promoted  Taken 9/17/2020 0015 by Liliana Baxter RN  Infection Prevention: rest/sleep promoted  Taken 9/16/2020 2227 by Liliana Baxter RN  Infection Prevention: rest/sleep promoted  Taken 9/16/2020 2102 by Liliana Baxter RN  Infection Prevention: rest/sleep promoted  Taken 9/16/2020 1944 by Liliana Baxter RN  Infection Prevention: rest/sleep promoted     Problem: Pain Chronic (Persistent) (Comorbidity Management)  Goal: Acceptable Pain Control and Functional Ability  Intervention: Develop Pain Management Plan  Recent Flowsheet Documentation  Taken 9/17/2020 0101 by  Liliana Baxter RN  Pain Management Interventions: see MAR  Taken 9/16/2020 2102 by Liliana Baxter RN  Pain Management Interventions: see MAR  Intervention: Manage Persistent Pain  Recent Flowsheet Documentation  Taken 9/16/2020 2102 by Liliana Baxter RN  Medication Review/Management: medications reviewed  Intervention: Optimize Psychosocial Wellbeing  Recent Flowsheet Documentation  Taken 9/16/2020 2102 by Liliana Baxter RN  Supportive Measures:   active listening utilized   self-care encouraged   Goal Outcome Evaluation:  Plan of Care Reviewed With: patient  Progress: improving  Outcome Summary: VSS; pt is alert and oriented; up with stand by assist; dressing to back with scant dressing; PRN pain meds given per pt request; will continue to monitor

## 2020-09-17 NOTE — DISCHARGE SUMMARY
Manuelito De Santiago  1955    Patient Care Team:  Reji Moyer MD as PCP - General (Family Medicine)    Date of Admit: 9/16/2020    Date of Discharge:  9/17/2020    Discharge Diagnosis:  Spinal stenosis, lumbar region, with neurogenic claudication    Spondylolisthesis of lumbar region    Synovial cyst of lumbar facet joint      Procedures Performed  Procedure(s):  Lumbar laminectomy lumbar 3 - lumbar 4, lumbar 4 - lumbar 5 for bilateral decompression       Complications: None    Consultants:   Consults     No orders found for last 30 day(s).          Condition on Discharge: stable    Discharge disposition: home    Brief HPI: This is a very pleasant 65-year-old male with a history of severe neurogenic claudication related to spinal stenosis.  He failed all conservative measures and was therefore brought to the operating room for the above-stated elective surgical procedure.  Please see admission H&P for further details.    Hospital Course:      After dissection of the lateral recesses at L3-4, L4-5 a very large synovial cyst was discovered as well as very severe right fibrosis of the medial facet which were all firmly adherent to the dura.  The synovial cyst was compressing the right thecal sac at the right L4 nerve root.  The synovial cyst was mobilized from the inflammatory tissue and resected by Dr. Mcdermott.  The patient reports significant improvement in his preoperative leg pain.  He is walking and tolerating well.  He is voiding without much difficulty.  Post void residuals have remained low.  The patient denies any nausea, vomiting.  He is tolerating a diet without difficulty.  His current pain medications are effective in relieving his postoperative discomfort.  He was informed that he will need to continue holding the meloxicam for about 2 weeks.  He is remained afebrile.  Vital signs of remained stable.  The lumbar incision is well approximated.  There is no redness, swelling, or drainage.  The  patient feels ready for discharge home.  Both he and Dr. Mcdermott are in agreement with the plan.      Temp:  [96.8 °F (36 °C)-98.9 °F (37.2 °C)] 98 °F (36.7 °C)  Heart Rate:  [57-69] 62  Resp:  [16-18] 16  BP: ()/(59-79) 106/59    Current labs:  Lab Results (last 24 hours)     ** No results found for the last 24 hours. **          Discharge Medications  Hakeem has been reviewed and narcotic consent is on file in the patient's chart.     Your medication list      START taking these medications      Instructions Last Dose Given Next Dose Due   baclofen 10 MG tablet  Commonly known as: LIORESAL      Take 1 tablet by mouth Every 8 (Eight) Hours As Needed for Muscle Spasms.       HYDROcodone-acetaminophen 7.5-325 MG per tablet  Commonly known as: NORCO      Take 1 tablet by mouth Every 6 (Six) Hours As Needed for Moderate Pain  or Severe Pain .          CONTINUE taking these medications      Instructions Last Dose Given Next Dose Due   atorvastatin 10 MG tablet  Commonly known as: LIPITOR      Take 10 mg by mouth Every Night.       gabapentin 300 MG capsule  Commonly known as: NEURONTIN      Take 300 mg by mouth 3 (Three) Times a Day.       levothyroxine 100 MCG tablet  Commonly known as: SYNTHROID, LEVOTHROID      Take 100 mcg by mouth Every Night.       Loratadine 10 MG capsule      Take 10 mg by mouth Daily.       omeprazole 20 MG capsule  Commonly known as: priLOSEC      Take 20 mg by mouth Daily.       rizatriptan MLT 10 MG disintegrating tablet  Commonly known as: MAXALT-MLT      Place 10 mg on the tongue 1 (One) Time As Needed for Migraine. May repeat in 2 hours if needed       tamsulosin 0.4 MG capsule 24 hr capsule  Commonly known as: FLOMAX      Take 1 capsule by mouth Every Night.          STOP taking these medications    meloxicam 15 MG tablet  Commonly known as: MOBIC              Where to Get Your Medications      These medications were sent to [facility] [address] [place] [address]  "[facility] - [phone]  - [phone] FX  [address] [facility] [address]    Phone: [phone]   · baclofen 10 MG tablet  · HYDROcodone-acetaminophen 7.5-325 MG per tablet         Discharge Diet:     Diet Order   Procedures   • Diet Regular       Activity at Discharge: Please refer to the attached postoperative instructions      Call for: questions or concerns    Follow-up Appointments  Future Appointments   Date Time Provider Department Center   9/29/2020  1:30 PM Dwight Mcdermott MD [facility]     Follow-up Information     Reji Moyer MD .    Specialty: Family Medicine  Contact information:  [address] [facility]  [address]  [facility] [address] [phone]                 Additional Instructions for the Follow-ups that You Need to Schedule     Call MD With Problems / Concerns   As directed      Instructions: Call Dr. Mcdermott's office for any worsening pain symptoms, chest pain, shortness of air, worsening leg pain, incisional redness, swelling, drainage or for any questions or concerns.    Order Comments: Instructions: Call Dr. Mcdermott's office for any worsening pain symptoms, chest pain, shortness of air, worsening leg pain, incisional redness, swelling, drainage or for any questions or concerns.          Discharge Follow-up with Specialty: Neurosurgery - Dr. Mcdermott; 2 Weeks   As directed      Specialty: Neurosurgery - Dr. Mcdermott    Follow Up: 2 Weeks    Follow Up Details: Keep 10 to 14-day postop appointment in Dr. Mcdermott's office.               Test Results Pending at Discharge  Pending Labs     Order Current Status    Tissue Pathology Exam In process         None    I discussed the discharge instructions with patient    Amaya JIMENEZ Cezar, APRN  09/17/20  14:13 EDT    \"Dictated utilizing Dragon dictation\".    "

## 2020-09-18 NOTE — PROGRESS NOTES
Case Management Discharge Note      Final Note: Home         Selected Continued Care - Discharged on 9/17/2020 Admission date: 9/16/2020 - Discharge disposition: Home or Self Care    Destination    No services have been selected for the patient.              Durable Medical Equipment    No services have been selected for the patient.              Dialysis/Infusion    No services have been selected for the patient.              Home Medical Care    No services have been selected for the patient.              Therapy    No services have been selected for the patient.              Community Resources    No services have been selected for the patient.                  Transportation Services  Private: Car    Final Discharge Disposition Code: 01 - home or self-care

## 2020-09-21 NOTE — PROGRESS NOTES
"Subjective   History of Present Illness: Manuelito De Santiago is a 65 y.o. male is here today for follow-up. Mr. De Santiago is 13 days out from having a Lumbar Laminectomy, Medial Facetectomy, and Foraminotomy bilaterally at L3 thru L5 with decompression of the cauda equina and nerve roots bilaterally at L3-4, L4-5, and L5-S1.  Careful resection of the synovial cyst of the right medial facet joint at L3-4 which was firmly adherent to the dura on 09/16/20. The incision looks clean and dry. No redness, swelling or drainage. Patient denies having fever. On a scale of 0-10, the patient rates his pain a 3. He denies leg pain. He reports back pain and he notices that if he does a little bit more the next day he might hurt a little bit more but then he is getting stronger each and every day.  He is very satisfied with the postop result thus far..     While in the room and during my examination of the patient I wore a mask and eye protection.  I washed my hands before and after this patient encounter.  The patient was also wearing a mask.    History of Present Illness    The following portions of the patient's history were reviewed and updated as appropriate: allergies, current medications, past family history, past medical history, past social history, past surgical history and problem list.    Review of Systems   Respiratory: Negative for chest tightness and shortness of breath.    Cardiovascular: Negative for chest pain.   Musculoskeletal: Positive for back pain.       Objective     Vitals:    09/29/20 1319   BP: 140/82   Temp: 99 °F (37.2 °C)   Weight: 71.2 kg (157 lb)   Height: 177.8 cm (70\")     Body mass index is 22.53 kg/m².      Physical Exam  Neurologic Exam    Physical Exam:    CONSTITUTIONAL:  appears well developed, well-nourished and in no acute distress.    NECK: the neck is supple and symmetric. The trachea is midline with no masses.      PULMONARY: Respiratory effort is normal with no increased work of breathing or " signs of respiratory distress.    CARDIOVASCULAR: Pedal pulses are +2/4 bilaterally. Examination of the extremities shows no edema or varicosities.    MUSCULOSKELETAL: Gait normal    SKIN: The skin is warm, dry and intact.  Lumbar incision is well-healed with only slight induration no erythema or drainage    NEUROLOGIC:   Normal motor strength noted. Muscle bulk and tone are normal.  Sensory exam is normal to all modalities.  Cortical function is intact and without deficits. Speech is normal.    PSYCHIATRIC: oriented to person, place and time. Patient's mood and affect are normal.      Assessment/Plan   Independent Review of Radiographic Studies:    The pathology report from surgery reveals just benign fibroconnective tissue    Medical Decision Making:      The patient can now lift up to 25 lbs. and may soak the incision in a bath or pool if desired. They will arrange Physical Therapy 2-3 times per week for 3 weeks to initiate a home exercise plan. It is safe for the patient to drive if they are comfortable driving and not consuming narcotic pain medications. Follow up is arranged following PT. All questions were reviewed and answered.    Return in about 4 weeks (around 10/27/2020) for discussion of Physical Therapy results.    Manuelito was seen today for post-op.    Diagnoses and all orders for this visit:    Synovial cyst of lumbar facet joint  -     Ambulatory Referral to Physical Therapy Evaluate and treat    Spinal stenosis, lumbar region, with neurogenic claudication  -     Ambulatory Referral to Physical Therapy Evaluate and treat    Sciatica of right side  -     Ambulatory Referral to Physical Therapy Evaluate and treat             Dwight Mcdermott MD [facility]  Neurological Surgery

## 2020-09-22 ENCOUNTER — TELEPHONE (OUTPATIENT)
Dept: NEUROSURGERY | Facility: CLINIC | Age: 65
End: 2020-09-22

## 2020-09-29 ENCOUNTER — OFFICE VISIT (OUTPATIENT)
Dept: NEUROSURGERY | Facility: CLINIC | Age: 65
End: 2020-09-29

## 2020-09-29 VITALS
HEIGHT: 70 IN | TEMPERATURE: 99 F | WEIGHT: 157 LBS | DIASTOLIC BLOOD PRESSURE: 82 MMHG | BODY MASS INDEX: 22.48 KG/M2 | SYSTOLIC BLOOD PRESSURE: 140 MMHG

## 2020-09-29 DIAGNOSIS — M71.38 SYNOVIAL CYST OF LUMBAR FACET JOINT: Primary | ICD-10-CM

## 2020-09-29 DIAGNOSIS — M48.062 SPINAL STENOSIS, LUMBAR REGION, WITH NEUROGENIC CLAUDICATION: ICD-10-CM

## 2020-09-29 DIAGNOSIS — M54.31 SCIATICA OF RIGHT SIDE: ICD-10-CM

## 2020-09-29 PROCEDURE — 99024 POSTOP FOLLOW-UP VISIT: CPT | Performed by: NEUROLOGICAL SURGERY

## 2020-10-21 NOTE — PROGRESS NOTES
"Subjective   History of Present Illness: Manuelito De Santiago is a 65 y.o. male is here today for follow-up. Mr. De Santiago is 6 weeks and 1 day out from having a Lumbar Laminectomy, Medial Facetectomy, and Foraminotomy bilaterally at L3 thru L5 with decompression of the cauda equina and nerve roots bilaterally at L3-4, L4-5, and L5-S1 on 09-16-20.    Today, Mr. De Santiago reports that he is still having right leg pain but it is improving feels like hamstring and quadricep tightness. He is still going to physical therapy is questioning the utility since he is doing most of the things they do at therapy at home.  Overall he is very satisfied with the postoperative result with none of the significant reoperative pain and neurogenic claudication left over.  The hamstring tightening is something he understands as part of the healing process.    While in the room and during my examination of the patient I wore a mask and eye protection.  I washed my hands before and after this patient encounter.  The patient was also wearing a mask.    History of Present Illness    The following portions of the patient's history were reviewed and updated as appropriate: allergies, current medications, past family history, past medical history, past social history, past surgical history and problem list.    Review of Systems   Respiratory: Negative for chest tightness and shortness of breath.    Cardiovascular: Negative for chest pain.   Musculoskeletal: Negative for back pain.       Objective     Vitals:    10/29/20 0935   BP: 128/73   Pulse: 88   Temp: 99.1 °F (37.3 °C)   Weight: 71.2 kg (157 lb)   Height: 177.8 cm (70\")     Body mass index is 22.53 kg/m².      Physical Exam  Neurologic Exam    Physical Exam:    CONSTITUTIONAL:  appears well developed, well-nourished and in no acute distress.    NECK: the neck is supple and symmetric. The trachea is midline with no masses.      PULMONARY: Respiratory effort is normal with no increased work of " breathing or signs of respiratory distress.    CARDIOVASCULAR: Pedal pulses are +2/4 bilaterally. Examination of the extremities shows no edema or varicosities.    MUSCULOSKELETAL: Gait normal    SKIN: The skin is warm, dry and intact.  Her incision well-healed    NEUROLOGIC:   Normal motor strength noted. Muscle bulk and tone are normal.  Sensory exam is normal to all modalities.  Straight leg raising test negative bilaterally  Cortical function is intact and without deficits. Speech is normal.    PSYCHIATRIC: oriented to person, place and time. Patient's mood and affect are normal.    Assessment/Plan     Medical Decision Making:      From the surgery perspective the healing has progressed to allow resumption of normal preoperative activity including work and recreation. We encouraged continuation of the therapy exercises long term. For now we will plan on follow up only as needed in the future and would be happy to re-evaluate at any time.     Return if symptoms worsen or fail to improve.    Diagnoses and all orders for this visit:    1. Synovial cyst of lumbar facet joint (Primary)    2. Spinal stenosis, lumbar region, with neurogenic claudication             Dwight Mcdermott MD FACS [facility]  Neurological Surgery

## 2020-10-29 ENCOUNTER — OFFICE VISIT (OUTPATIENT)
Dept: NEUROSURGERY | Facility: CLINIC | Age: 65
End: 2020-10-29

## 2020-10-29 VITALS
HEART RATE: 88 BPM | TEMPERATURE: 99.1 F | BODY MASS INDEX: 22.48 KG/M2 | HEIGHT: 70 IN | DIASTOLIC BLOOD PRESSURE: 73 MMHG | WEIGHT: 157 LBS | SYSTOLIC BLOOD PRESSURE: 128 MMHG

## 2020-10-29 DIAGNOSIS — M48.062 SPINAL STENOSIS, LUMBAR REGION, WITH NEUROGENIC CLAUDICATION: ICD-10-CM

## 2020-10-29 DIAGNOSIS — M71.38 SYNOVIAL CYST OF LUMBAR FACET JOINT: Primary | ICD-10-CM

## 2020-10-29 PROBLEM — M43.16 SPONDYLOLISTHESIS OF LUMBAR REGION: Status: RESOLVED | Noted: 2020-05-21 | Resolved: 2020-10-29

## 2020-10-29 PROCEDURE — 99024 POSTOP FOLLOW-UP VISIT: CPT | Performed by: NEUROLOGICAL SURGERY

## 2021-01-05 ENCOUNTER — TELEPHONE (OUTPATIENT)
Dept: NEUROSURGERY | Facility: CLINIC | Age: 66
End: 2021-01-05

## 2021-01-05 NOTE — TELEPHONE ENCOUNTER
Caller: MARU SO    Relationship to patient: PT    Best call back number: [phone]    Chief complaint: LOW BACK PAIN SUDDENLY STARTED 2 WEEKS AGO AND IS GETTING WORSE. PAIN TRAVELS DOWN BOTH LEGS, LEFT LEG IS WORSE WITH PAIN RADIATING TO ANKLE.    Type of visit: FOLLOW UP    Requested date: ASAP    If rescheduling, when is the original appointment: N/A    Additional notes: PT HAS NOT HAD RECENT IMAGING, FOLLOW UP STATES AS NEEDED. LUMBAR LAMINECTOMY 9/16/20, NO RECENT IMAGING.    S/W GURDEEP, ADVISED SEND TO [facility] FOR REVIEW DUE TO MIDLEVEL SCHEDULES. OK TO SCHEDULE WITH DR FLYNN WITH NO IMAGING? PLEASE ADVISE.    THANK YOU.

## 2021-01-07 NOTE — PROGRESS NOTES
Subjective   History of Present Illness: Manuelito De Santiago is a 65 y.o. male is here today for follow-up. Mr. De Santiago was last seen on 10-29-20. He had a Lumbar Laminectomy, Medial Facetectomy, and Foraminotomy bilaterally at L3 through L5 with decompression of the cauda equina and nerve roots bilaterally at L3-4, L4-5, and L5-S1 on 09-16-20.    Today, Mr. De Santiago reports constant back pain that radiates into bilateral legs that began about 3-4 weeks ago.  He states in November he was doing some heavy work felt like he may have overdone it and had some aggravation of his back above his incision.  Over the last 2 to 3 weeks he has noticed recurrent right buttock pain into the calf and when he has constant positions he notices pain go all the way down the back of the left leg.  Interestingly when he gets up and moves the pain in the left leg resolves fairly quickly.  He denies any weakness or numbness in the lower extremities.  He denies any bowel or bladder symptoms.    While in the room and during my examination of the patient I wore a mask and eye protection.  I washed my hands before and after this patient encounter.  The patient was also wearing a mask.    Back Pain  The current episode started 1 to 4 weeks ago. The problem occurs constantly. The problem has been gradually worsening since onset. The pain is present in the lumbar spine. The quality of the pain is described as aching. The pain radiates to the right thigh, right knee, left thigh, left knee and right foot. Associated symptoms include leg pain. Pertinent negatives include no bladder incontinence, bowel incontinence, chest pain, numbness, tingling or weakness.       The following portions of the patient's history were reviewed and updated as appropriate: allergies, current medications, past family history, past medical history, past social history, past surgical history and problem list.    Review of Systems   Constitutional: Positive for activity change.  "  Respiratory: Negative for chest tightness and shortness of breath.    Cardiovascular: Negative for chest pain.   Gastrointestinal: Negative for bowel incontinence.   Genitourinary: Negative for bladder incontinence.   Musculoskeletal: Positive for back pain.   Neurological: Negative for tingling, weakness and numbness.       Objective     Vitals:    01/08/21 0810   BP: 132/71   Temp: (!) 91 °F (32.8 °C)   Weight: 71.2 kg (157 lb)   Height: 177.8 cm (70\")     Body mass index is 22.53 kg/m².      Physical Exam  Neurologic Exam    Physical Exam:    CONSTITUTIONAL:  appears well developed, well-nourished and in no acute distress.    NECK: the neck is supple and symmetric. The trachea is midline with no masses.      PULMONARY: Respiratory effort is normal with no increased work of breathing or signs of respiratory distress.    CARDIOVASCULAR: Pedal pulses are +2/4 bilaterally. Examination of the extremities shows no edema or varicosities.    MUSCULOSKELETAL: Gait normal    SKIN: The skin is warm, dry and intact.  Lumbar skin incision healed he has an area that he has been rubbing above the area of the incision with some erythema of the skin in that location but no signs of infection    NEUROLOGIC:   Normal motor strength noted. Muscle bulk and tone are normal.  Sensory exam is normal to all modalities.  Reflexes 1/4 at the knees and symmetrical  Straight leg raising test does not aggravate the pain in both buttocks  Cortical function is intact and without deficits. Speech is normal.    PSYCHIATRIC: oriented to person, place and time. Patient's mood and affect are normal.    Assessment/Plan     Medical Decision Making:      Given the patient's recurrent pain although it is a slightly different pattern than what he had prior to surgery were compelled to get a work-up during the postoperative period.  I will obtain an MRI of the lumbar spine with and without contrast, flexion-extension lumbar films given the slight " spondylolisthesis identified preoperatively, and labs to rule out infection.  We will see him back upon completion of the work-up.    Return for review of completed workup.    Diagnoses and all orders for this visit:    1. Chronic midline low back pain with bilateral sciatica (Primary)  -     XR Spine Lumbar Complete With Flex & Ext; Future  -     MRI Lumbar Spine With & Without Contrast; Future  -     CBC & Differential  -     C-reactive Protein  -     Sedimentation Rate    2. Spondylolisthesis of lumbar region  -     XR Spine Lumbar Complete With Flex & Ext; Future  -     MRI Lumbar Spine With & Without Contrast; Future  -     CBC & Differential  -     C-reactive Protein  -     Sedimentation Rate             Dwight Mcdermott MD FACS [facility]  Neurological Surgery

## 2021-01-08 ENCOUNTER — OFFICE VISIT (OUTPATIENT)
Dept: NEUROSURGERY | Facility: CLINIC | Age: 66
End: 2021-01-08

## 2021-01-08 ENCOUNTER — HOSPITAL ENCOUNTER (OUTPATIENT)
Dept: MRI IMAGING | Facility: HOSPITAL | Age: 66
Discharge: HOME | End: 2021-01-08

## 2021-01-08 ENCOUNTER — LAB (OUTPATIENT)
Dept: LAB | Facility: HOSPITAL | Age: 66
End: 2021-01-08

## 2021-01-08 ENCOUNTER — APPOINTMENT (OUTPATIENT)
Dept: OTHER | Facility: HOSPITAL | Age: 66
End: 2021-01-08

## 2021-01-08 ENCOUNTER — HOSPITAL ENCOUNTER (OUTPATIENT)
Dept: GENERAL RADIOLOGY | Facility: HOSPITAL | Age: 66
Discharge: HOME | End: 2021-01-08

## 2021-01-08 VITALS
DIASTOLIC BLOOD PRESSURE: 71 MMHG | SYSTOLIC BLOOD PRESSURE: 132 MMHG | BODY MASS INDEX: 22.48 KG/M2 | HEIGHT: 70 IN | TEMPERATURE: 91 F | WEIGHT: 157 LBS

## 2021-01-08 DIAGNOSIS — M54.42 CHRONIC MIDLINE LOW BACK PAIN WITH BILATERAL SCIATICA: ICD-10-CM

## 2021-01-08 DIAGNOSIS — M54.41 CHRONIC MIDLINE LOW BACK PAIN WITH BILATERAL SCIATICA: ICD-10-CM

## 2021-01-08 DIAGNOSIS — M43.16 SPONDYLOLISTHESIS OF LUMBAR REGION: ICD-10-CM

## 2021-01-08 DIAGNOSIS — G89.29 CHRONIC MIDLINE LOW BACK PAIN WITH BILATERAL SCIATICA: ICD-10-CM

## 2021-01-08 DIAGNOSIS — M54.42 CHRONIC MIDLINE LOW BACK PAIN WITH BILATERAL SCIATICA: Primary | ICD-10-CM

## 2021-01-08 DIAGNOSIS — M54.41 CHRONIC MIDLINE LOW BACK PAIN WITH BILATERAL SCIATICA: Primary | ICD-10-CM

## 2021-01-08 DIAGNOSIS — G89.29 CHRONIC MIDLINE LOW BACK PAIN WITH BILATERAL SCIATICA: Primary | ICD-10-CM

## 2021-01-08 DIAGNOSIS — Z09 FOLLOW UP: ICD-10-CM

## 2021-01-08 LAB
BASOPHILS # BLD AUTO: 0.03 10*3/MM3 (ref 0–0.2)
BASOPHILS NFR BLD AUTO: 0.3 % (ref 0–1.5)
CRP SERPL-MCNC: 0.06 MG/DL (ref 0–0.5)
DEPRECATED RDW RBC AUTO: 42.6 FL (ref 37–54)
EOSINOPHIL # BLD AUTO: 0.05 10*3/MM3 (ref 0–0.4)
EOSINOPHIL NFR BLD AUTO: 0.6 % (ref 0.3–6.2)
ERYTHROCYTE [DISTWIDTH] IN BLOOD BY AUTOMATED COUNT: 13.3 % (ref 12.3–15.4)
ERYTHROCYTE [SEDIMENTATION RATE] IN BLOOD: 1 MM/HR (ref 0–20)
HCT VFR BLD AUTO: 45.1 % (ref 37.5–51)
HGB BLD-MCNC: 14.8 G/DL (ref 13–17.7)
IMM GRANULOCYTES # BLD AUTO: 0.03 10*3/MM3 (ref 0–0.05)
IMM GRANULOCYTES NFR BLD AUTO: 0.3 % (ref 0–0.5)
LYMPHOCYTES # BLD AUTO: 1.77 10*3/MM3 (ref 0.7–3.1)
LYMPHOCYTES NFR BLD AUTO: 20.1 % (ref 19.6–45.3)
MCH RBC QN AUTO: 29 PG (ref 26.6–33)
MCHC RBC AUTO-ENTMCNC: 32.8 G/DL (ref 31.5–35.7)
MCV RBC AUTO: 88.4 FL (ref 79–97)
MONOCYTES # BLD AUTO: 0.76 10*3/MM3 (ref 0.1–0.9)
MONOCYTES NFR BLD AUTO: 8.6 % (ref 5–12)
NEUTROPHILS NFR BLD AUTO: 6.15 10*3/MM3 (ref 1.7–7)
NEUTROPHILS NFR BLD AUTO: 70.1 % (ref 42.7–76)
NRBC BLD AUTO-RTO: 0 /100 WBC (ref 0–0.2)
PLATELET # BLD AUTO: 173 10*3/MM3 (ref 140–450)
PMV BLD AUTO: 10.4 FL (ref 6–12)
RBC # BLD AUTO: 5.1 10*6/MM3 (ref 4.14–5.8)
WBC # BLD AUTO: 8.79 10*3/MM3 (ref 3.4–10.8)

## 2021-01-08 PROCEDURE — A9577 INJ MULTIHANCE: HCPCS | Performed by: NEUROLOGICAL SURGERY

## 2021-01-08 PROCEDURE — 99214 OFFICE O/P EST MOD 30 MIN: CPT | Performed by: NEUROLOGICAL SURGERY

## 2021-01-08 PROCEDURE — 36415 COLL VENOUS BLD VENIPUNCTURE: CPT | Performed by: NEUROLOGICAL SURGERY

## 2021-01-08 PROCEDURE — 85652 RBC SED RATE AUTOMATED: CPT | Performed by: NEUROLOGICAL SURGERY

## 2021-01-08 PROCEDURE — 72114 X-RAY EXAM L-S SPINE BENDING: CPT

## 2021-01-08 PROCEDURE — 72158 MRI LUMBAR SPINE W/O & W/DYE: CPT

## 2021-01-08 PROCEDURE — 85025 COMPLETE CBC W/AUTO DIFF WBC: CPT | Performed by: NEUROLOGICAL SURGERY

## 2021-01-08 PROCEDURE — 0 GADOBENATE DIMEGLUMINE 529 MG/ML SOLUTION: Performed by: NEUROLOGICAL SURGERY

## 2021-01-08 PROCEDURE — 86140 C-REACTIVE PROTEIN: CPT | Performed by: NEUROLOGICAL SURGERY

## 2021-01-08 RX ADMIN — GADOBENATE DIMEGLUMINE 15 ML: 529 INJECTION, SOLUTION INTRAVENOUS at 10:37

## 2021-01-08 NOTE — PROGRESS NOTES
"Subjective   History of Present Illness: Manuelito De Santiago is a 65 y.o. male is here today for follow-up with a new lumbar MRI, x-ray's and labs. Mr. De Santiago was last in the office on 01-08-21 with complaints of constant back pain that radiates into bilateral legs.  He states that it is most commonly to the right leg to the knee and not below the knee.  Although when he sits he does get left leg pain that goes below the knee.  He attributes this to doing more activity around Thanksgiving and then having to clean out a clogged drain in his sink around the time of Christmas.  He denies any weakness or numbness.  He denies any bowel or bladder symptoms.    Today, Mr. De Santiago reports there have been no changes in his symptoms since last being in the office.     While in the room and during my examination of the patient I wore a mask and eye protection.  I washed my hands before and after this patient encounter.  The patient was also wearing a mask.    Back Pain  The current episode started 1 to 4 weeks ago. The pain radiates to the left knee, left thigh, left foot, right thigh, right knee and right foot. The pain is at a severity of 5/10. The pain is moderate. The symptoms are aggravated by position. Associated symptoms include leg pain. Pertinent negatives include no chest pain, numbness, tingling or weakness.       The following portions of the patient's history were reviewed and updated as appropriate: allergies, current medications, past family history, past medical history, past social history, past surgical history and problem list.    Review of Systems   Respiratory: Negative for chest tightness and shortness of breath.    Cardiovascular: Negative for chest pain.   Musculoskeletal: Positive for back pain.   Neurological: Negative for tingling, weakness and numbness.       Objective     Vitals:    01/14/21 1146   BP: 135/75   Pulse: 78   Temp: 97.8 °F (36.6 °C)   Weight: 71.2 kg (157 lb)   Height: 177.8 cm (70\") "     Body mass index is 22.53 kg/m².      Physical Exam  Neurologic Exam    Physical Exam:    CONSTITUTIONAL:  appears well developed, well-nourished and in no acute distress.    NECK: the neck is supple and symmetric. The trachea is midline with no masses.      PULMONARY: Respiratory effort is normal with no increased work of breathing or signs of respiratory distress.    CARDIOVASCULAR: Pedal pulses are +2/4 bilaterally. Examination of the extremities shows no edema or varicosities.    MUSCULOSKELETAL: Gait slight lumbago posture    SKIN: The skin is warm, dry and intact.  Lumbar incision healed    NEUROLOGIC:   Normal motor strength noted. Muscle bulk and tone are normal.  Sensory exam is normal to all modalities.  Straight leg raising test Jay sign negative  Cortical function is intact and without deficits. Speech is normal.    PSYCHIATRIC: oriented to person, place and time. Patient's mood and affect are normal.      Assessment/Plan   Independent Review of Radiographic Studies:     Sed rate 1 normal  C-reactive protein 0.06 normal  WBC 8.79 normal     I personally reviewed the images from the following studies.    X-rays of the lumbar spine done on January 8, 2021 reveals disc space narrowing of the endplates of L5-S1 and slight anterior subluxation of L4-5 measuring 4 mm in flexion and 1 mm in extension.  The amount of subluxation is very subtle and I actually do not measure the dimensions the radiologist did and the formal interpretation I dictated above and I do not think it represents gross instability.    MRI of the lumbar spine done January 8, 2021 at [facility] reveals decompression of the L3-4 level from the decompressive laminectomy there is also decompression at L4-5 although there is what appears to be a small synovial cyst on the right at the L4-5 level that is in the region of the right L5 nerve root.  Only mild bilateral foraminal narrowing is suspected at the postoperative  level.    Medical Decision Making:      Given the exacerbation of pain when he had such a fantastic result from surgery 5 months ago think it is that felt like he had a new lease on life and has overdone it in the initial postoperative period.  The MRI suggested there could be some osteoarthritic synovial cyst changes to the right that might explain the right leg symptoms but there is nothing on the left to explain his left leg symptoms.  He has spondylolisthesis and there is a little bit of change in flexion extension but it is very subtle and within physiologically allowable limits.  Therefore I do not think additional surgery makes sense at this stage.  I think he can get a good result and possibly back to his level of comfort with interventional pain management.  If he does not and continues to have progressive problems we may have to contemplate a revision surgery and possibly even a fusion.  If he undergoes that he will not be able to continue activity at the pace he tried to continue last fall.  He would like to try to avoid additional surgery and is encouraged by the option of interventional pain management.    No follow-ups on file.    Diagnoses and all orders for this visit:    1. Chronic midline low back pain with bilateral sciatica (Primary)  -     Ambulatory Referral to Pain Management    2. Spondylolisthesis of lumbar region  -     Ambulatory Referral to Pain Management             Dwight Mcdermott MD FACS [facility]  Neurological Surgery

## 2021-01-14 ENCOUNTER — OFFICE VISIT (OUTPATIENT)
Dept: NEUROSURGERY | Facility: CLINIC | Age: 66
End: 2021-01-14

## 2021-01-14 VITALS
BODY MASS INDEX: 22.48 KG/M2 | SYSTOLIC BLOOD PRESSURE: 135 MMHG | WEIGHT: 157 LBS | HEIGHT: 70 IN | TEMPERATURE: 97.8 F | DIASTOLIC BLOOD PRESSURE: 75 MMHG | HEART RATE: 78 BPM

## 2021-01-14 DIAGNOSIS — M43.16 SPONDYLOLISTHESIS OF LUMBAR REGION: ICD-10-CM

## 2021-01-14 DIAGNOSIS — G89.29 CHRONIC MIDLINE LOW BACK PAIN WITH BILATERAL SCIATICA: Primary | ICD-10-CM

## 2021-01-14 DIAGNOSIS — M54.41 CHRONIC MIDLINE LOW BACK PAIN WITH BILATERAL SCIATICA: Primary | ICD-10-CM

## 2021-01-14 DIAGNOSIS — M54.42 CHRONIC MIDLINE LOW BACK PAIN WITH BILATERAL SCIATICA: Primary | ICD-10-CM

## 2021-01-14 PROCEDURE — 99213 OFFICE O/P EST LOW 20 MIN: CPT | Performed by: NEUROLOGICAL SURGERY

## 2021-09-09 ENCOUNTER — TELEPHONE (OUTPATIENT)
Dept: NEUROSURGERY | Facility: CLINIC | Age: 66
End: 2021-09-09

## 2021-09-09 NOTE — TELEPHONE ENCOUNTER
Caller: Manuelito De Santiago    Relationship to patient: Self    Best call back number: [phone]   Chief complaint: LEG PAIN/ POSS. SURGERY    Type of visit: FOLLOW UP EXT    Requested date: ASAP    If rescheduling, when is the original appointment: LAST APPOINTMENT CINDI FLYNN 01/14/21    Additional notes: PATIENT WOULD LIKE TO KNOW IF HE SHOULD HAVE ANOTHER MRI BEFORE SCHEDULING WITH DR. FLYNN FOR A FOLLOW UP APPOINTMENT.  PATIENT HAS TRIED PAIN MANAGEMENT AND THEY ARE DISCUSSING SCS BUT PATIENT WAS ADVISED TO ASK DR. FLYNN DUE TO LEG PAIN BELOW HIS KNEE AND THAT IT OVERCOMES HIS WHOLE LEG.  PATIENT STATES THAT HE IS WORSE NOW THAN EVER.   PLEASE ADVISE ON SCHEDULING.      THANK YOU

## 2021-09-09 NOTE — TELEPHONE ENCOUNTER
Patient was last seen 01-14-21 and was referred to pain management. Patient is wanting to follow-up with us due to increase in pain. Does he need imaging prior to scheduling?

## 2021-09-09 NOTE — TELEPHONE ENCOUNTER
He already had imaging in January so no new imaging.  I can see him but will likely refer him for evaluation for fusion.

## 2021-09-10 NOTE — PROGRESS NOTES
Subjective   History of Present Illness: Manuelito De Santiago is a 66 y.o. male is here today for follow-up. Mr. De Santiago was last seen on 01-14-21 with complaints of constant back pain that radiates into bilateral legs.    Today, Mr. De Santiago reports back pain that radiates into bilateral legs; right greater than left with numbness. He denies tingling and weakness. He has been going to pain management and has tried several different injections.  He has a sense that over time the pain is shifted from more the left leg to the right leg with the conservative management.  He says that overall he does not have the constant symptomatology that he had prior to surgery last year but his lifestyle still limited due to the back pain and right greater than left leg pain that persists.    While in the room and during my examination of the patient I wore a mask and eye protection.  I washed my hands before and after this patient encounter.  The patient was also wearing a mask.    Back Pain  The problem has been gradually worsening since onset. The pain is present in the lumbar spine. The quality of the pain is described as aching. The pain radiates to the right thigh, right knee, right foot, left thigh, left knee and left foot. The pain is moderate. The symptoms are aggravated by position and standing (walking). Associated symptoms include numbness. Pertinent negatives include no bladder incontinence, bowel incontinence, chest pain, tingling or weakness. Treatments tried: injection therapy.       The following portions of the patient's history were reviewed and updated as appropriate: allergies, current medications, past family history, past medical history, past social history, past surgical history and problem list.    Review of Systems   Respiratory: Negative for chest tightness and shortness of breath.    Cardiovascular: Negative for chest pain.   Gastrointestinal: Negative for bowel incontinence.   Genitourinary: Negative for  "bladder incontinence.   Musculoskeletal: Positive for back pain.   Neurological: Positive for numbness. Negative for tingling and weakness.       Objective     Vitals:    09/14/21 0821   BP: 166/92   Temp: 98.4 °F (36.9 °C)   Weight: 71.2 kg (157 lb)   Height: 177.8 cm (70\")     Body mass index is 22.53 kg/m².      Physical Exam  Neurologic Exam    Physical Exam:    CONSTITUTIONAL:  appears well developed, well-nourished and in no acute distress.    NECK: the neck is supple and symmetric. The trachea is midline with no masses.      PULMONARY: Respiratory effort is normal with no increased work of breathing or signs of respiratory distress.    CARDIOVASCULAR: Pedal pulses are +2/4 bilaterally. Examination of the extremities shows no edema or varicosities.    MUSCULOSKELETAL: Gait slight bent over posture as he ambulates    SKIN: The skin is warm, dry and intact.  Bar incision healed    NEUROLOGIC:   Normal motor strength noted. Muscle bulk and tone are normal.  Sensory exam is normal to all modalities.  Straight leg raising test reveals some right leg pain on the right, negative on the left  Reflexes 1/4 at the knees and symmetrical, 0/4 at the ankles and symmetrical  Cortical function is intact and without deficits. Speech is normal.    PSYCHIATRIC: oriented to person, place and time. Patient's mood and affect are normal.    Assessment/Plan   Independent Review of Radiographic Studies:      I personally reviewed the images from the following studies.    X-rays of the lumbar spine done on January 8, 2021 reveals disc space narrowing of the endplates of L5-S1 and slight anterior subluxation of L4-5 measuring 4 mm in flexion and 1 mm in extension.  The amount of subluxation is very subtle and I actually do not measure the dimensions the radiologist did and the formal interpretation I dictated above and I do not think it represents gross instability.     MRI of the lumbar spine done January 8, 2021 at [facility] " reveals decompression of the L3-4 level from the decompressive laminectomy there is also decompression at L4-5 although there is what appears to be a small synovial cyst on the right at the L4-5 level that is in the region of the right L5 nerve root.  Only mild bilateral foraminal narrowing is suspected at the postoperative level.    Medical Decision Making:      Patient's had continued and progressive low back pain with bilateral lower extremity symptoms with documentation of hypermobility and spondylolisthesis at L4-5.  In this scenario with the pattern of back pain and lower extremity complaints and the failure of conservative management he should be considered for lumbar stabilization.  I will refer him to a spinal subspecialist who is experienced with spinal deformity and lumbar fusion.    No follow-ups on file.    Diagnoses and all orders for this visit:    1. Spondylolisthesis of lumbar region (Primary)  -     Ambulatory Referral to Orthopedic Surgery    2. Chronic midline low back pain with bilateral sciatica  -     Ambulatory Referral to Orthopedic Surgery             Dwight Mcdermott MD FACS [facility]  Neurological Surgery

## 2021-09-14 ENCOUNTER — OFFICE VISIT (OUTPATIENT)
Dept: NEUROSURGERY | Facility: CLINIC | Age: 66
End: 2021-09-14

## 2021-09-14 VITALS
DIASTOLIC BLOOD PRESSURE: 92 MMHG | WEIGHT: 157 LBS | SYSTOLIC BLOOD PRESSURE: 166 MMHG | TEMPERATURE: 98.4 F | BODY MASS INDEX: 22.48 KG/M2 | HEIGHT: 70 IN

## 2021-09-14 DIAGNOSIS — M54.41 CHRONIC MIDLINE LOW BACK PAIN WITH BILATERAL SCIATICA: ICD-10-CM

## 2021-09-14 DIAGNOSIS — M54.42 CHRONIC MIDLINE LOW BACK PAIN WITH BILATERAL SCIATICA: ICD-10-CM

## 2021-09-14 DIAGNOSIS — M43.16 SPONDYLOLISTHESIS OF LUMBAR REGION: Primary | ICD-10-CM

## 2021-09-14 DIAGNOSIS — G89.29 CHRONIC MIDLINE LOW BACK PAIN WITH BILATERAL SCIATICA: ICD-10-CM

## 2021-09-14 PROCEDURE — 99213 OFFICE O/P EST LOW 20 MIN: CPT | Performed by: NEUROLOGICAL SURGERY

## 2021-09-14 RX ORDER — MELOXICAM 15 MG/1
TABLET ORAL
COMMUNITY

## 2021-12-01 ENCOUNTER — OFFICE VISIT (OUTPATIENT)
Dept: ORTHOPEDIC SURGERY | Facility: CLINIC | Age: 66
End: 2021-12-01

## 2021-12-01 VITALS — WEIGHT: 165 LBS | HEIGHT: 70 IN | TEMPERATURE: 97.1 F | BODY MASS INDEX: 23.62 KG/M2

## 2021-12-01 DIAGNOSIS — M48.062 SPINAL STENOSIS OF LUMBAR REGION WITH NEUROGENIC CLAUDICATION: ICD-10-CM

## 2021-12-01 DIAGNOSIS — M43.16 SPONDYLOLISTHESIS OF LUMBAR REGION: Primary | ICD-10-CM

## 2021-12-01 PROCEDURE — 99204 OFFICE O/P NEW MOD 45 MIN: CPT | Performed by: ORTHOPAEDIC SURGERY

## 2021-12-01 RX ORDER — GABAPENTIN 600 MG/1
TABLET ORAL
COMMUNITY
Start: 2021-11-10

## 2021-12-03 NOTE — PROGRESS NOTES
New patient or new problem visit    CC: Low back pain right leg pain    HPI: He is seen today at request of Dr. Jonny Mcdermott who in September 2020 performed a lumbar laminectomy from which he enjoyed good relief for a while but pain returned and back pain is worse.  He is tried epidural injections of facet and ablations, SI injections, pain management all without relief.  There was a question of placement of a dorsal column stimulator by Dr. Rodríguez who he sees.  No bowel or bladder complaints fever chills or weight loss.    PFSH: See attached    ROS: See attached    PE: On exam he has well-healed incision in the lumbar spine.  Good strength in the legs absent reflexes sensation appears grossly intact    XRAY: Looks like he has had laminectomies from L3-S1 as noted on plain film x-rays which shows borderline instability at L4-5 on flexion-extension films which move 3 mm.  There may be some minimal foraminal narrowing at 3 4, some recess narrowing at 4 5 and foraminal narrowing at 5 1 where there is marked disc degeneration and disc space flattening.  I reviewed the radiologist report with which I agree.    Other: n/a    Impression: Lumbar spondylolisthesis L4-5 with borderline instability and foraminal stenosis at 4 5 and 5 1.  3 4 looks less significant.    Plan: For now he is going to think about it.  I did not encourage surgery anytime soon but I also told him that this is a potentially curable problem and that I would rather see him undergo decompression and fusion and placement of a dorsal column stimulator for this, although I do not think it is wrong to do so and he certainly does not have any profound level of stenosis other than some significant changes in the foramina..  I also told him that for now it safe to live with.  I do think that if he eventually comes to surgery we would be looking at getting a myelogram and CT scan ahead of time and deciding whether or not to include L3-4 and a decompression and  fusion with instrumentation.  Definitely 4 5 and 5 1 would be part of the equation.

## 2023-10-24 ENCOUNTER — TRANSCRIBE ORDERS (OUTPATIENT)
Dept: ADMINISTRATIVE | Facility: HOSPITAL | Age: 68
End: 2023-10-24
Payer: MEDICARE

## 2023-10-24 DIAGNOSIS — M51.36 DEGENERATION OF LUMBAR INTERVERTEBRAL DISC: Primary | ICD-10-CM

## 2023-11-13 ENCOUNTER — APPOINTMENT (OUTPATIENT)
Dept: GENERAL RADIOLOGY | Facility: HOSPITAL | Age: 68
End: 2023-11-13
Payer: MEDICARE

## 2023-11-13 ENCOUNTER — HOSPITAL ENCOUNTER (OUTPATIENT)
Dept: MRI IMAGING | Facility: HOSPITAL | Age: 68
Discharge: HOME | End: 2023-11-13
Payer: MEDICARE

## 2023-11-13 DIAGNOSIS — M51.36 DEGENERATION OF LUMBAR INTERVERTEBRAL DISC: ICD-10-CM

## 2023-11-13 PROCEDURE — 82565 ASSAY OF CREATININE: CPT

## 2023-11-13 PROCEDURE — 72158 MRI LUMBAR SPINE W/O & W/DYE: CPT

## 2023-11-13 PROCEDURE — 73090 X-RAY EXAM OF FOREARM: CPT | Performed by: EMERGENCY MEDICINE

## 2023-11-13 PROCEDURE — 73090 X-RAY EXAM OF FOREARM: CPT

## 2023-11-13 PROCEDURE — A9577 INJ MULTIHANCE: HCPCS

## 2023-11-13 PROCEDURE — 0 GADOBENATE DIMEGLUMINE 529 MG/ML SOLUTION

## 2023-11-13 RX ADMIN — GADOBENATE DIMEGLUMINE 15 ML: 529 INJECTION, SOLUTION INTRAVENOUS at 11:57

## 2023-11-14 LAB — CREAT BLDA-MCNC: 1 MG/DL (ref 0.6–1.3)

## 2024-01-26 ENCOUNTER — TRANSCRIBE ORDERS (OUTPATIENT)
Dept: ADMINISTRATIVE | Facility: HOSPITAL | Age: 69
End: 2024-01-26
Payer: MEDICARE

## 2024-01-26 ENCOUNTER — HOSPITAL ENCOUNTER (OUTPATIENT)
Dept: CARDIOLOGY | Facility: HOSPITAL | Age: 69
Discharge: HOME | End: 2024-01-26
Payer: MEDICARE

## 2024-01-26 DIAGNOSIS — Z01.818 PRE-OP EXAM: ICD-10-CM

## 2024-01-26 DIAGNOSIS — Z01.818 PRE-OP EXAM: Primary | ICD-10-CM

## 2024-01-26 DIAGNOSIS — I10 BENIGN HYPERTENSION: ICD-10-CM

## 2024-01-26 LAB
QT INTERVAL: 373 MS
QTC INTERVAL: 397 MS

## 2024-01-26 PROCEDURE — 93005 ELECTROCARDIOGRAM TRACING: CPT | Performed by: SURGERY

## 2024-03-28 NOTE — PROGRESS NOTES
"Subjective   Patient ID: Manuelito De Santiago is a 68 y.o. male is here today for follow-up on MRI done on 11/13/23    Is very nice gentleman was operated on by Dr. Mcdermott 2020 having undergone a lumbar decompression at L3-L4 and L4-L5.  He developed a spondylolisthesis at L4-L5 which showed some signs of radiographic instability.  He had been referred to Dr. Anand in late 2021 who told him that if his pain progressed then he would likely need a fusion which I agree with.  But at that time and even now the pain is manageable.  He works with [employer] and sees Dr. Rodríguez.  He is on gabapentin at 800 mg 3 times daily.  He has not had an epidural block recently.  The main pain is in the back and the right buttock and leg in the L5 distribution.  The left leg actually is not doing all that badly.  He wanted to know if he could have epidural blocks again I think that is perfectly fine using the right-sided transforaminal technique at L4-L5.  He does not feel that he is quite ready for surgery but it might come to that at some point.  Will arrange for Dr. Rodríguez to try those injections and have him come back in about 4 months with flexion-extension x-rays.        History of Present Illness    The following portions of the patient's history were reviewed and updated as appropriate: allergies, current medications, past family history, past medical history, past social history, past surgical history, and problem list.    Review of Systems   Constitutional:  Negative for fever.   Musculoskeletal:  Positive for gait problem.   Neurological:  Positive for numbness. Negative for weakness.   All other systems reviewed and are negative.          Objective     Vitals:    04/01/24 1616   BP: 118/70   BP Location: Left arm   Patient Position: Sitting   Cuff Size: Adult   Resp: 20   Weight: 74.8 kg (165 lb)   Height: 177.8 cm (70\")   PainSc:   4   PainLoc: Leg     Body mass index is 23.68 kg/m².    Tobacco Use: High Risk " (4/1/2024)    Patient History     Smoking Tobacco Use: Every Day     Smokeless Tobacco Use: Former     Passive Exposure: Not on file          Physical Exam  Constitutional:       Appearance: He is well-developed.   HENT:      Head: Normocephalic and atraumatic.   Eyes:      Extraocular Movements: EOM normal.      Conjunctiva/sclera: Conjunctivae normal.      Pupils: Pupils are equal, round, and reactive to light.   Neck:      Vascular: No carotid bruit.   Neurological:      Mental Status: He is oriented to person, place, and time.      Coordination: Finger-Nose-Finger Test and Heel to Shin Test normal.      Gait: Gait is intact.      Deep Tendon Reflexes:      Reflex Scores:       Tricep reflexes are 2+ on the right side and 2+ on the left side.       Bicep reflexes are 2+ on the right side and 2+ on the left side.       Brachioradialis reflexes are 2+ on the right side and 2+ on the left side.       Patellar reflexes are 2+ on the right side and 2+ on the left side.       Achilles reflexes are 2+ on the right side and 2+ on the left side.  Psychiatric:         Speech: Speech normal.       Neurologic Exam     Mental Status   Oriented to person, place, and time.   Registration of memory: Good recent and remote memory.   Attention: normal. Concentration: normal.   Speech: speech is normal   Level of consciousness: alert  Knowledge: consistent with education.     Cranial Nerves     CN II   Visual fields full to confrontation.   Visual acuity: normal    CN III, IV, VI   Pupils are equal, round, and reactive to light.  Extraocular motions are normal.     CN V   Facial sensation intact.   Right corneal reflex: normal  Left corneal reflex: normal    CN VII   Facial expression full, symmetric.   Right facial weakness: none  Left facial weakness: none    CN VIII   Hearing: intact    CN IX, X   Palate: symmetric    CN XI   Right sternocleidomastoid strength: normal  Left sternocleidomastoid strength: normal    CN XII    Tongue: not atrophic  Tongue deviation: none    Motor Exam   Muscle bulk: normal  Right arm tone: normal  Left arm tone: normal  Right leg tone: normal  Left leg tone: normal    Strength   Strength 5/5 except as noted.     Sensory Exam   Light touch normal.     Gait, Coordination, and Reflexes     Gait  Gait: normal    Coordination   Finger to nose coordination: normal  Heel to shin coordination: normal    Reflexes   Right brachioradialis: 2+  Left brachioradialis: 2+  Right biceps: 2+  Left biceps: 2+  Right triceps: 2+  Left triceps: 2+  Right patellar: 2+  Left patellar: 2+  Right achilles: 2+  Left achilles: 2+  Right : 2+  Left : 2+          Assessment & Plan   Independent Review of Radiographic Studies:      I personally reviewed the images from the following studies.    The MRI done on 11/13/2023 of the lumbar spine shows postoperative changes at L3-L4 and L4-L5.  There is a spondylolisthesis at L4-L5 and recurrent stenosis at that level.  Agree with the report.        Medical Decision Making:      Will asked Dr. Rodríguez to try at least 2 right L4-L5 transforaminal epidural blocks.  He will continue his gabapentin given to him by the pain service at 800 mg 3 times daily.  Will have him come back in 4 months with flexion-extension films.  If worse comes to worse then we might need to proceed with myelography and consider a decompression and fusion at L4-L5 possibly L3-L4 as well.        Diagnoses and all orders for this visit:    1. Spondylolisthesis at L4-L5 level (Primary)  -     XR Spine Lumbar Complete With Flex & Ext; Future  -     Ambulatory Referral to Pain Management    2. Spinal stenosis of lumbar region with neurogenic claudication  -     XR Spine Lumbar Complete With Flex & Ext; Future  -     Ambulatory Referral to Pain Management    3. History of lumbar surgery  -     XR Spine Lumbar Complete With Flex & Ext; Future  -     Ambulatory Referral to Pain Management    4. Spinal instability of  lumbar region  -     XR Spine Lumbar Complete With Flex & Ext; Future  -     Ambulatory Referral to Pain Management      Return in about 4 months (around 8/1/2024) for face to face.

## 2024-04-01 ENCOUNTER — OFFICE VISIT (OUTPATIENT)
Dept: NEUROSURGERY | Facility: CLINIC | Age: 69
End: 2024-04-01
Payer: MEDICARE

## 2024-04-01 VITALS
SYSTOLIC BLOOD PRESSURE: 118 MMHG | WEIGHT: 165 LBS | RESPIRATION RATE: 20 BRPM | DIASTOLIC BLOOD PRESSURE: 70 MMHG | HEIGHT: 70 IN | BODY MASS INDEX: 23.62 KG/M2

## 2024-04-01 DIAGNOSIS — Z98.890 HISTORY OF LUMBAR SURGERY: ICD-10-CM

## 2024-04-01 DIAGNOSIS — M48.062 SPINAL STENOSIS OF LUMBAR REGION WITH NEUROGENIC CLAUDICATION: ICD-10-CM

## 2024-04-01 DIAGNOSIS — M53.2X6 SPINAL INSTABILITY OF LUMBAR REGION: ICD-10-CM

## 2024-04-01 DIAGNOSIS — M43.16 SPONDYLOLISTHESIS AT L4-L5 LEVEL: Primary | ICD-10-CM

## 2024-04-01 PROCEDURE — 99214 OFFICE O/P EST MOD 30 MIN: CPT | Performed by: NEUROLOGICAL SURGERY

## 2024-04-01 PROCEDURE — 1160F RVW MEDS BY RX/DR IN RCRD: CPT | Performed by: NEUROLOGICAL SURGERY

## 2024-04-01 PROCEDURE — 1159F MED LIST DOCD IN RCRD: CPT | Performed by: NEUROLOGICAL SURGERY

## 2024-04-01 RX ORDER — CELECOXIB 200 MG/1
200 CAPSULE ORAL DAILY
COMMUNITY

## 2024-12-06 NOTE — PROGRESS NOTES
Subjective   Patient ID: Manuelito De Santiago is a 69 y.o. male is here today for follow-up after Pain Management and with XR Spine Lumbar Complete With Flex & Ext     I have not seen this patient in about 9 months.  He is with his wife.  He had a lumbar decompression by Dr. Mcdermott about 4 years ago at L3-L4 and L4-L5.  He has recurrent stenosis at L3-L4 and L4-L5 as well as a spondylolisthesis at L4-L5.  He had injections by [employer] involving the right side but that did not really help.  He is on gabapentin at 800 mg 3 times daily which seems to help but he still has some pain in his right buttock and leg when he walks and starting to have some pain in his left buttock.  He is not taking any narcotics.  He is retired and says that he can tolerate things for now but I think it is just a matter of time before we have to do a lumbar decompression and fusion particular L4-L5 and possibly L3-L4.  Will continue to follow him.  He has no motor deficits.  I will see him in 6 months with x-rays but if things flareup between now and then he will need a new MRI of the lumbar spine.  The last time I saw him he had an MRI in November 2023.    History of Present Illness    The following portions of the patient's history were reviewed and updated as appropriate: allergies, current medications, past family history, past medical history, past social history, past surgical history, and problem list.    Review of Systems   Constitutional:  Negative for fever.   Musculoskeletal:  Positive for back pain and gait problem.   Neurological:  Positive for weakness and numbness.   All other systems reviewed and are negative.      Objective   Physical Exam  Constitutional:       General: He is awake.      Appearance: He is well-developed.   HENT:      Head: Normocephalic and atraumatic.   Eyes:      General: Lids are normal.      Extraocular Movements: Extraocular movements intact.      Conjunctiva/sclera: Conjunctivae normal.      Pupils:  Pupils are equal, round, and reactive to light.   Neck:      Vascular: No carotid bruit.   Neurological:      Mental Status: He is alert.      Coordination: Coordination is intact.      Deep Tendon Reflexes:      Reflex Scores:       Tricep reflexes are 2+ on the right side and 2+ on the left side.       Bicep reflexes are 2+ on the right side and 2+ on the left side.       Brachioradialis reflexes are 2+ on the right side and 2+ on the left side.       Patellar reflexes are 2+ on the right side and 2+ on the left side.       Achilles reflexes are 2+ on the right side and 2+ on the left side.  Psychiatric:         Speech: Speech normal.       Neurological Exam  Mental Status  Awake and alert. Oriented only to person, place, time and situation. Recent and remote memory are intact. Speech is normal. Language is fluent with no aphasia. Attention and concentration are normal. Fund of knowledge is appropriate for level of education.    Cranial Nerves  CN II: Visual acuity is normal. Visual fields full to confrontation.  CN III, IV, VI: Extraocular movements intact bilaterally. Normal lids and orbits bilaterally. Pupils equal round and reactive to light bilaterally.  CN V: Facial sensation is normal.  CN VII: Full and symmetric facial movement.  CN IX, X: Palate elevates symmetrically. Normal gag reflex.  CN XI: Shoulder shrug strength is normal.  CN XII: Tongue midline without atrophy or fasciculations.    Motor  Normal muscle bulk throughout. Normal muscle tone.                                               Right                     Left  Rhomboids                            5                          5  Infraspinatus                          5                          5  Supraspinatus                       5                          5  Deltoid                                   5                          5   Biceps                                   5                          5  Brachioradialis                      5                           5   Triceps                                  5                          5   Pronator                                5                          5   Supinator                              5                           5   Wrist flexor                            5                          5   Wrist extensor                       5                          5   Finger flexor                          5                          5   Finger extensor                     5                          5   Interossei                              5                          5   Abductor pollicis brevis         5                          5   Flexor pollicis brevis             5                          5   Opponens pollicis                 5                          5  Extensor digitorum               5                          5  Abductor digiti minimi           5                          5   Abdominal                            5                          5  Glutei                                    5                          5  Hip abductor                         5                          5  Hip adductor                         5                          5   Iliopsoas                               5                          5   Quadriceps                           5                          5   Hamstring                             5                          5   Gastrocnemius                     5                           5   Anterior tibialis                      5                          5   Posterior tibialis                    5                          5   Peroneal                               5                          5  Ankle dorsiflexor                   5                          5  Ankle plantar flexor              5                           5  Extensor hallucis longus      5                           5    Sensory  Light touch is normal in upper and lower extremities. Proprioception is normal in  upper and lower extremities.     Reflexes                                            Right                      Left  Brachioradialis                    2+                         2+  Biceps                                 2+                         2+  Triceps                                2+                         2+  Finger flex                           2+                         2+  Hamstring                            2+                         2+  Patellar                                2+                         2+  Achilles                                2+                         2+    Coordination    Finger-to-nose, rapid alternating movements and heel-to-shin normal bilaterally without dysmetria.    Gait  Casual gait is normal including stance, stride, and arm swing.Normal toe walking. Normal heel walking. Normal tandem gait.       Assessment & Plan   Independent Review of Radiographic Studies:      X-rays of the lumbar spine done today on 12/11/2024 show the spondylolisthesis at L4-L5 with no significant translational movement.  Agree with the report.      Medical Decision Making:      He feels he can tolerate things for now.  He will continue taking his gabapentin from [facility] at 800 mg 3 times daily and talk to them about potentially increasing the dose.  I will see him in 6 months with x-rays.  If things go downhill and he thinks he wants to consider surgery then we will get a new lumbar MRI without contrast and have him come back to see me earlier.      Diagnoses and all orders for this visit:    1. Spondylolisthesis at L4-L5 level (Primary)  -     XR Spine Lumbar Complete With Flex & Ext; Future    2. Spinal stenosis of lumbar region with neurogenic claudication  -     XR Spine Lumbar Complete With Flex & Ext; Future    3. History of lumbar surgery  -     XR Spine Lumbar Complete With Flex & Ext; Future      Return in about 6 months (around 6/11/2025) for Face-to-face.

## 2024-12-11 ENCOUNTER — OFFICE VISIT (OUTPATIENT)
Dept: NEUROSURGERY | Facility: CLINIC | Age: 69
End: 2024-12-11
Payer: MEDICARE

## 2024-12-11 VITALS
WEIGHT: 165 LBS | HEIGHT: 70 IN | RESPIRATION RATE: 20 BRPM | SYSTOLIC BLOOD PRESSURE: 118 MMHG | BODY MASS INDEX: 23.62 KG/M2 | DIASTOLIC BLOOD PRESSURE: 62 MMHG

## 2024-12-11 DIAGNOSIS — M43.16 SPONDYLOLISTHESIS AT L4-L5 LEVEL: Primary | ICD-10-CM

## 2024-12-11 DIAGNOSIS — M48.062 SPINAL STENOSIS OF LUMBAR REGION WITH NEUROGENIC CLAUDICATION: ICD-10-CM

## 2024-12-11 DIAGNOSIS — Z98.890 HISTORY OF LUMBAR SURGERY: ICD-10-CM

## 2024-12-11 PROCEDURE — 99213 OFFICE O/P EST LOW 20 MIN: CPT | Performed by: NEUROLOGICAL SURGERY

## 2024-12-11 PROCEDURE — 1160F RVW MEDS BY RX/DR IN RCRD: CPT | Performed by: NEUROLOGICAL SURGERY

## 2024-12-11 PROCEDURE — 1159F MED LIST DOCD IN RCRD: CPT | Performed by: NEUROLOGICAL SURGERY

## 2025-06-06 NOTE — PROGRESS NOTES
Subjective   Patient ID: Manuelito De Santiago is a 69 y.o. male is here today for 6 month follow-up w/ xrays.    History of Present Illness    He has been about 6 months since have seen him.  He had a lumbar decompression at L3-L4 and L4-L5 by Dr. Mcdermott about 5 years ago.  He has developed recurrent stenosis with back and right leg pain largely because of a spondylolisthesis and recurrent stenosis at L4-L5.  He had been getting gabap[facility] 800 [facility] and continues to do that.  He no longer does the blocks because they did not really help him.  He has no motor deficits but he does have difficulty walking and standing as expected and difficulty sleeping because of his back and right leg pain.  No left leg pain.  I talked again about the possibility of doing another fusion surgery and he is uncertain about that.  I told him if he feels that he can no longer function then we should get a new MRI and consider surgery.  His x-rays today were discussed.  No outright instability.  Will see him in 7 months but if he decide that he wants to consider surgery all he has to do is call call us and let us know and we will get a new lumbar MRI with and without contrast and move his follow-up visit up.    The following portions of the patient's history were reviewed and updated as appropriate: allergies, current medications, past family history, past medical history, past social history, past surgical history, and problem list.    Review of Systems   All other systems reviewed and are negative.      Objective   Physical Exam  Constitutional:       General: He is awake.      Appearance: He is well-developed.   HENT:      Head: Normocephalic and atraumatic.   Eyes:      General: Lids are normal.      Extraocular Movements: Extraocular movements intact.      Conjunctiva/sclera: Conjunctivae normal.      Pupils: Pupils are equal, round, and reactive to light.   Neck:      Vascular: No carotid  bruit.   Neurological:      Mental Status: He is alert.      Coordination: Coordination is intact.      Deep Tendon Reflexes:      Reflex Scores:       Tricep reflexes are 2+ on the right side and 2+ on the left side.       Bicep reflexes are 2+ on the right side and 2+ on the left side.       Brachioradialis reflexes are 2+ on the right side and 2+ on the left side.       Patellar reflexes are 2+ on the right side and 2+ on the left side.       Achilles reflexes are 2+ on the right side and 2+ on the left side.  Psychiatric:         Speech: Speech normal.       Neurological Exam  Mental Status  Awake and alert. Oriented only to person, place, time and situation. Recent and remote memory are intact. Speech is normal. Language is fluent with no aphasia. Attention and concentration are normal. Fund of knowledge is appropriate for level of education.    Cranial Nerves  CN II: Visual acuity is normal. Visual fields full to confrontation.  CN III, IV, VI: Extraocular movements intact bilaterally. Normal lids and orbits bilaterally. Pupils equal round and reactive to light bilaterally.  CN V: Facial sensation is normal.  CN VII: Full and symmetric facial movement.  CN IX, X: Palate elevates symmetrically. Normal gag reflex.  CN XI: Shoulder shrug strength is normal.  CN XII: Tongue midline without atrophy or fasciculations.    Motor  Normal muscle bulk throughout. Normal muscle tone.                                               Right                     Left  Rhomboids                            5                          5  Infraspinatus                          5                          5  Supraspinatus                       5                          5  Deltoid                                   5                          5   Biceps                                   5                          5  Brachioradialis                      5                          5   Triceps                                  5                           5   Pronator                                5                          5   Supinator                              5                           5   Wrist flexor                            5                          5   Wrist extensor                       5                          5   Finger flexor                          5                          5   Finger extensor                     5                          5   Interossei                              5                          5   Abductor pollicis brevis         5                          5   Flexor pollicis brevis             5                          5   Opponens pollicis                 5                          5  Extensor digitorum               5                          5  Abductor digiti minimi           5                          5   Abdominal                            5                          5  Glutei                                    5                          5  Hip abductor                         5                          5  Hip adductor                         5                          5   Iliopsoas                               5                          5   Quadriceps                           5                          5   Hamstring                             5                          5   Gastrocnemius                     5                           5   Anterior tibialis                      5                          5   Posterior tibialis                    5                          5   Peroneal                               5                          5  Ankle dorsiflexor                   5                          5  Ankle plantar flexor              5                           5  Extensor hallucis longus      5                           5    Sensory  Light touch is normal in upper and lower extremities. Proprioception is normal in upper and lower extremities.     Reflexes                                             Right                      Left  Brachioradialis                    2+                         2+  Biceps                                 2+                         2+  Triceps                                2+                         2+  Finger flex                           2+                         2+  Hamstring                            2+                         2+  Patellar                                2+                         2+  Achilles                                2+                         2+    Coordination    Finger-to-nose, rapid alternating movements and heel-to-shin normal bilaterally without dysmetria.    Gait  Casual gait is normal including stance, stride, and arm swing.Normal toe walking. Normal heel walking. Normal tandem gait.       Assessment & Plan   Independent Review of Radiographic Studies:      Lumbar x-rays today done on 6/11/2025 show no significant change in the L4-L5 spondylolisthesis without any demonstrable instability.    Medical Decision Making:      He does not feel he is quite ready to consider a lumbar decompression and fusion.  Will see him in 7 months.  If things get worse and he wants to consider surgery, we will get a lumbar MRI with and without contrast and have him come sooner to see me.      Diagnoses and all orders for this visit:    1. Spondylolisthesis at L4-L5 level (Primary)    2. Spinal stenosis of lumbar region with neurogenic claudication    3. History of lumbar surgery      Return in about 7 months (around 1/11/2026) for face to face.

## 2025-06-11 ENCOUNTER — OFFICE VISIT (OUTPATIENT)
Dept: NEUROSURGERY | Facility: CLINIC | Age: 70
End: 2025-06-11
Payer: MEDICARE

## 2025-06-11 VITALS
SYSTOLIC BLOOD PRESSURE: 120 MMHG | HEART RATE: 73 BPM | HEIGHT: 70 IN | DIASTOLIC BLOOD PRESSURE: 70 MMHG | OXYGEN SATURATION: 97 % | BODY MASS INDEX: 22.9 KG/M2 | WEIGHT: 160 LBS

## 2025-06-11 DIAGNOSIS — M43.16 SPONDYLOLISTHESIS AT L4-L5 LEVEL: Primary | ICD-10-CM

## 2025-06-11 DIAGNOSIS — M48.062 SPINAL STENOSIS OF LUMBAR REGION WITH NEUROGENIC CLAUDICATION: ICD-10-CM

## 2025-06-11 DIAGNOSIS — Z98.890 HISTORY OF LUMBAR SURGERY: ICD-10-CM

## 2025-06-11 PROCEDURE — 99213 OFFICE O/P EST LOW 20 MIN: CPT | Performed by: NEUROLOGICAL SURGERY

## 2025-06-11 PROCEDURE — 1160F RVW MEDS BY RX/DR IN RCRD: CPT | Performed by: NEUROLOGICAL SURGERY

## 2025-06-11 PROCEDURE — 1159F MED LIST DOCD IN RCRD: CPT | Performed by: NEUROLOGICAL SURGERY

## 2025-06-11 RX ORDER — BACLOFEN 10 MG/1
TABLET ORAL
COMMUNITY

## (undated) DEVICE — COTTON BALLS, DOUBLE STRUNG: Brand: DEROYAL

## (undated) DEVICE — ANTIBACTERIAL UNDYED BRAIDED (POLYGLACTIN 910), SYNTHETIC ABSORBABLE SUTURE: Brand: COATED VICRYL

## (undated) DEVICE — UNDYED BRAIDED (POLYGLACTIN 910), SYNTHETIC ABSORBABLE SUTURE: Brand: COATED VICRYL

## (undated) DEVICE — OSC GEN LAPAROSCOPY: Brand: MEDLINE INDUSTRIES, INC.

## (undated) DEVICE — SYR LUERLOK 50ML

## (undated) DEVICE — ENDOPATH XCEL BLADELESS TROCARS WITH STABILITY SLEEVES: Brand: ENDOPATH XCEL

## (undated) DEVICE — GLV SURG BIOGEL LTX PF 7

## (undated) DEVICE — SMOKE EVACUATION TUBING WITH 7/8 IN TO 1/4 IN REDUCER: Brand: BUFFALO FILTER

## (undated) DEVICE — 3.0MM TAPERED ROUTER

## (undated) DEVICE — SPNG GZ WOVN 4X4IN 12PLY 10/BX STRL

## (undated) DEVICE — SUT VIC 0 CT1 CR8 27IN JJ41G

## (undated) DEVICE — SYR CONTRL LUERLOK 10CC

## (undated) DEVICE — 2, DISPOSABLE SUCTION/IRRIGATOR WITH DISPOSABLE TIP: Brand: STRYKEFLOW

## (undated) DEVICE — DRSNG TELFA PAD NONADH STR 1S 3X4IN

## (undated) DEVICE — DIFFUSER: Brand: CORE, MAESTRO

## (undated) DEVICE — DRP MICROSCOPE 4 BINOCULAR CV 54X150IN

## (undated) DEVICE — APPL CHLORAPREP HI/LITE 26ML ORNG

## (undated) DEVICE — SUT VIC UD BR COAT OS/4 0 27IN SLVR J694H

## (undated) DEVICE — APPL CHLORAPREP W/TINT 26ML ORNG

## (undated) DEVICE — SUT VIC 3/0 X1 27IN J458H

## (undated) DEVICE — CONN TBG Y 5 IN 1 LF STRL

## (undated) DEVICE — SKIN PREP TRAY W/CHG: Brand: MEDLINE INDUSTRIES, INC.

## (undated) DEVICE — GLV SURG SIGNATURE ESSENTIAL PF LTX SZ7.5

## (undated) DEVICE — DRSNG WND GZ PAD BORDERED 4X8IN STRL

## (undated) DEVICE — GLV SURG BIOGEL LTX PF 8

## (undated) DEVICE — DRSNG WND BORDR/ADHS NONADHR/GZ LF 2X2IN STRL

## (undated) DEVICE — DISPOSABLE BIPOLAR CABLE 12FT. (3.6M): Brand: KIRWAN

## (undated) DEVICE — NEEDLE, QUINCKE, 20GX3.5": Brand: MEDLINE

## (undated) DEVICE — SYR LUERLOK 30CC

## (undated) DEVICE — GLV SURG BIOGEL LTX PF 7 1/2

## (undated) DEVICE — 3.0MM PRECISION NEURO (MATCH HEAD)

## (undated) DEVICE — GLV SURG SENSICARE PI LF PF 7.5 GRN STRL

## (undated) DEVICE — 3M™ STERI-STRIP™ ANTIMICROBIAL SKIN CLOSURES 1/2 INCH X 4 INCHES 50/CARTON 4 CARTONS/CASE A1847: Brand: 3M™ STERI-STRIP™

## (undated) DEVICE — EXTENSION SET, MALE LUER LOCK ADAPTER WITH RETRACTABLE COLLAR

## (undated) DEVICE — 3M™ STERI-STRIP™ COMPOUND BENZOIN TINCTURE 40 BAGS/CARTON 4 CARTONS/CASE C1544: Brand: 3M™ STERI-STRIP™

## (undated) DEVICE — NEEDLE, QUINCKE 22GX3.5": Brand: MEDLINE INDUSTRIES, INC.

## (undated) DEVICE — OIL CARTRIDGE: Brand: CORE, MAESTRO

## (undated) DEVICE — PK NEURO SPINE 40

## (undated) DEVICE — ELECTRD BLD EDGE/STD 1P 2.4X6.35MM STRL